# Patient Record
Sex: FEMALE | Race: WHITE | Employment: UNEMPLOYED | URBAN - METROPOLITAN AREA
[De-identification: names, ages, dates, MRNs, and addresses within clinical notes are randomized per-mention and may not be internally consistent; named-entity substitution may affect disease eponyms.]

---

## 2018-01-18 ENCOUNTER — TELEPHONE (OUTPATIENT)
Dept: FAMILY MEDICINE CLINIC | Age: 43
End: 2018-01-18

## 2022-11-21 ENCOUNTER — OFFICE VISIT (OUTPATIENT)
Dept: FAMILY MEDICINE CLINIC | Age: 47
End: 2022-11-21
Payer: MEDICAID

## 2022-11-21 VITALS
SYSTOLIC BLOOD PRESSURE: 139 MMHG | WEIGHT: 252.8 LBS | DIASTOLIC BLOOD PRESSURE: 85 MMHG | TEMPERATURE: 97 F | BODY MASS INDEX: 44.79 KG/M2 | HEART RATE: 92 BPM | HEIGHT: 63 IN

## 2022-11-21 DIAGNOSIS — R73.03 PREDIABETES: Primary | ICD-10-CM

## 2022-11-21 DIAGNOSIS — Z12.11 COLON CANCER SCREENING: ICD-10-CM

## 2022-11-21 DIAGNOSIS — E03.9 HYPOTHYROIDISM, UNSPECIFIED TYPE: ICD-10-CM

## 2022-11-21 DIAGNOSIS — Z00.00 HEALTHCARE MAINTENANCE: ICD-10-CM

## 2022-11-21 LAB — HBA1C MFR BLD: 5.7 %

## 2022-11-21 PROCEDURE — 83036 HEMOGLOBIN GLYCOSYLATED A1C: CPT

## 2022-11-21 ASSESSMENT — ENCOUNTER SYMPTOMS
NAUSEA: 0
COUGH: 0
SHORTNESS OF BREATH: 0
CONSTIPATION: 0
ABDOMINAL PAIN: 0
SORE THROAT: 0
VOMITING: 0
DIARRHEA: 0

## 2022-11-21 ASSESSMENT — PATIENT HEALTH QUESTIONNAIRE - PHQ9
SUM OF ALL RESPONSES TO PHQ QUESTIONS 1-9: 0
2. FEELING DOWN, DEPRESSED OR HOPELESS: 0
SUM OF ALL RESPONSES TO PHQ QUESTIONS 1-9: 0
SUM OF ALL RESPONSES TO PHQ9 QUESTIONS 1 & 2: 0
SUM OF ALL RESPONSES TO PHQ QUESTIONS 1-9: 0
SUM OF ALL RESPONSES TO PHQ QUESTIONS 1-9: 0
DEPRESSION UNABLE TO ASSESS: PT REFUSES
1. LITTLE INTEREST OR PLEASURE IN DOING THINGS: 0

## 2022-11-21 NOTE — PROGRESS NOTES
Mariajose Meza (:  1975) is a 50 y.o. female,New patient, here for evaluation of the following chief complaint(s):  Diabetes (Follow up, blood sugars have been good )    ASSESSMENT/PLAN:    1. Prediabetes  -     POCT glycosylated hemoglobin (Hb A1C)  2. Colon cancer screening  -     Fecal DNA Colorectal cancer screening (Cologuard)  3. Healthcare maintenance  -     Hepatitis C Antibody; Future  -     Lipid Panel; Future  4. Hypothyroidism, unspecified type  -     TSH With Reflex Ft4; Future    We will repeat her TSH at this time. Once results are back and if elevated which I expect to be likely the case we will start her on Synthroid. A1c today 5.7. Patient will likely benefit from diabetes prevention program.  We will discuss further at follow-up. Patient did also state tingling sensation in her bilateral hands. Saba Crock intelligence sign was negative. Patient used to be on gabapentin and is now requesting gabapentin at this time. A1c unremarkable. We will continue to follow for now. Return in about 3 months (around 2023), or if symptoms worsen or fail to improve, for PAP smear. Subjective     SUBJECTIVE/OBJECTIVE:    HPI    Ms. Lilian Ortez, 53-year-old  female, with previous medical history of hypothyroidism and prediabetes. Presents to the [facility] clinic today to reestablish care and to follow-up on diabetes and for thyroidism. Hypothyroidism  Patient's previous TSH in 2019 was 40.05. Patient used to be on levothyroxine 150 mcg oral daily  Last took Synthroid over 2 years ago  Last took any of her medications over 2 years ago for that matter  Does not report any symptoms at this time    Prediabetes  Previous A1c in 2019 was 6.2. Patient used to be on metformin at that time.   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 Quit smoking 16 years ago. Only smoked for 3 months  Denies alcohol  Denies illicit drug use including heroin    Review of Systems   Constitutional:  Negative for activity change and fever. HENT:  Negative for congestion and sore throat. Respiratory:  Negative for cough and shortness of breath. Cardiovascular:  Negative for chest pain and leg swelling. Gastrointestinal:  Negative for abdominal pain, constipation, diarrhea, nausea and vomiting. Genitourinary:  Positive for frequency. Negative for difficulty urinating and dysuria. Neurological:  Negative for syncope and headaches. Psychiatric/Behavioral:  Negative for agitation, behavioral problems and confusion. Objective   Physical Exam  Vitals and nursing note reviewed. Constitutional:       General: She is not in acute distress. Appearance: Normal appearance. Cardiovascular:      Rate and Rhythm: Normal rate and regular rhythm. Heart sounds: No murmur heard. Pulmonary:      Effort: No respiratory distress. Breath sounds: Normal breath sounds. No wheezing. Abdominal:      Palpations: Abdomen is soft. Tenderness: There is no abdominal tenderness. There is no guarding or rebound. Musculoskeletal:         General: No tenderness. Right lower leg: No edema. Left lower leg: No edema. Comments: Fibroma 2-3 cm right sole foot   Neurological:      General: No focal deficit present. Mental Status: She is alert and oriented to person, place, and time. Motor: No weakness. An electronic signature was used to authenticate this note.     --Russ Perez MD

## 2022-11-21 NOTE — PROGRESS NOTES
Diabetic visit information    BP Readings from Last 3 Encounters:   07/25/19 (!) 129/92   05/23/19 109/71   04/29/19 130/89       Hemoglobin A1C (%)   Date Value   03/11/2019 6.2   04/17/2017 6.2     LDL Cholesterol (mg/dL)   Date Value   09/22/2017 104               Have you changed or started any medications since your last visit including any over-the-counter medicines, vitamins, or herbal medicines? no   Have you stopped taking any of your medications? Is so, why? -  no  Are you having any side effects from any of your medications? - no    Have you seen any other physician or provider since your last visit?  no   Have you had any other diagnostic tests since your last visit?  no   Have you been seen in the emergency room and/or had an admission in a hospital since we last saw you?  no     Have you had your annual diabetic retinal (eye) exam? No   (ensure copy of exam is in the chart)    Have you had your routine dental cleaning in the past 6 months? no    Do you have an active [employer] account? If not, what are your barriers? No: Declines    Patient Care Team:  Apolinar Alvarado MD as PCP - General (Family Medicine)    Medical history Review  Past Medical, Family, and Social History reviewed and does contribute to the patient presenting condition.     Health Maintenance   Topic Date Due    COVID-19 Vaccine (1) Never done    Depression Screen  Never done    Hepatitis C screen  Never done    DTaP/Tdap/Td vaccine (1 - Tdap) Never done    Colorectal Cancer Screen  Never done    A1C test (Diabetic or Prediabetic)  03/11/2020    Cervical cancer screen  03/20/2020    Flu vaccine (1) Never done    Lipids  09/22/2022    HIV screen  Completed    Hepatitis A vaccine  Aged Out    Hib vaccine  Aged Out    Meningococcal (ACWY) vaccine  Aged Out    Pneumococcal 0-64 years Vaccine  Aged Out

## 2022-11-21 NOTE — PATIENT INSTRUCTIONS
Thank you for letting us take care of you today. We hope all your questions were addressed. If a question was overlooked or something else comes to mind after you return home, please contact a member of your Care Team listed below. Your Care Team at [place] [address] is Team #2  Shonda Betancur DO (Faculty)  Willis Augustine (Faculty)  Steven Heath MD (Resident)  Sumit Delatorre MD (Resident)  Russ Perez MD (Resident)  Emma Jennings MD (Resident)  Rick Matthews., HELENE Cobos.,  MA  Joss Sinha., ANTHONYN  Duane Estrada., [facility] office)  Luz Marina Quinones, [address] [facility] [address] (Clinical Practice Manager)  Yumiko Dela Cruz [facility] (Clinical Pharmacist)     Office phone number: [phone]    If you need to get in right away due to illness, please be advised we have \"Same Day\" appointments available Monday-Friday. Please call us at [phone] option #3 to schedule your \"Same Day\" appointment.

## 2022-11-21 NOTE — PROGRESS NOTES
Attending Physician Statement  I have discussed the care of Warren Catalan, 50 y.o. female,including pertinent history and exam findings,  with the resident Dr. Shahla Jean-Baptiste MD.  History:  Chief Complaint   Patient presents with    Diabetes     Follow up, blood sugars have been good      Patient has not been seen in 3 years. I have reviewed the key elements of the encounter with the resident. Examination was done by resident as documented in residents note. BP Readings from Last 3 Encounters:   11/21/22 139/85   07/25/19 (!) 129/92   05/23/19 109/71     /85 (Site: Right Lower Arm, Position: Sitting, Cuff Size: Medium Adult)   Pulse 92   Temp 97 °F (36.1 °C) (Oral)   Ht 5' 2.99\" (1.6 m)   Wt 252 lb 12.8 oz (114.7 kg)   BMI 44.79 kg/m²   Lab Results   Component Value Date    CHOL 194 09/22/2017    TRIG 133 09/22/2017    HDL 63 09/22/2017    TSH 40.05 (H) 04/29/2019    LABA1C 5.7 11/21/2022     No results found for: CALCIUM, PHOS  Lab Results   Component Value Date    LDLCHOLESTEROL 104 09/22/2017     I agree with the assessment, plan and diagnosis of    Diagnosis Orders   1. Prediabetes  POCT glycosylated hemoglobin (Hb A1C)      2. Colon cancer screening  Fecal DNA Colorectal cancer screening (Cologuard)      3. Healthcare maintenance  Hepatitis C Antibody    Lipid Panel      4. Hypothyroidism, unspecified type  TSH With Reflex Ft4        I agree with  orders as documented by the resident. Recommendations: Diabetic Prevention program would likely be very beneficial for patient, will follow up on lab results chiefly her TSH and resume her Synthroid ASAP. Will also consider repeating/doing EMG for hand complaints and consider other pain relief medications for possible neuropathic pain (eg. SNRI, TCA, caspacin, etc) in setting of Suboxone therapy and history of drug abuse. Resident team to follow-up on lab results and communicate this with patient.     Return in about 3 months (around 2/21/2023), or if symptoms worsen or fail to improve, for PAP smear.    (Elenita Stanley ) Dr. Michael Coughlin MD

## 2022-11-29 ENCOUNTER — TELEPHONE (OUTPATIENT)
Dept: FAMILY MEDICINE CLINIC | Age: 47
End: 2022-11-29

## 2022-11-29 DIAGNOSIS — M72.2 PLANTAR FASCIAL FIBROMATOSIS OF RIGHT FOOT: Primary | ICD-10-CM

## 2022-11-29 NOTE — TELEPHONE ENCOUNTER
Wants a note for [employer] stating she can't do her services right now due to her feet issues.   Please Advise

## 2022-11-29 NOTE — LETTER
[address]  [address]  [place] [address]  Phone: [phone]  Fax: [phone]    Apolinar Alvarado MD        November 29, 2022    [facility] [address], [address] [facility]      To Whom It May Concern,    Ms. Sofia Hawk was seen at our clinic on 11/21/2022. She has a nodule on her right foot that causes her pain. I would like to request 1-2 days off for her to allow for recovery. I would also like to request work that does not involve increased amounts of stress on her feet. If you have any questions or concerns, please don't hesitate to call.     Sincerely,        Apolinar Alvarado MD

## 2022-12-01 ENCOUNTER — TELEMEDICINE (OUTPATIENT)
Dept: FAMILY MEDICINE CLINIC | Age: 47
End: 2022-12-01
Payer: MEDICAID

## 2022-12-01 ENCOUNTER — HOSPITAL ENCOUNTER (OUTPATIENT)
Age: 47
Setting detail: SPECIMEN
Discharge: HOME | End: 2022-12-01

## 2022-12-01 DIAGNOSIS — M79.89 BILATERAL SWELLING OF FEET: ICD-10-CM

## 2022-12-01 DIAGNOSIS — M79.89 BILATERAL SWELLING OF FEET: Primary | ICD-10-CM

## 2022-12-01 LAB
ALBUMIN SERPL-MCNC: 4.3 G/DL (ref 3.5–5.2)
ALBUMIN/GLOBULIN RATIO: 1.3 (ref 1–2.5)
ALP BLD-CCNC: 127 U/L (ref 35–104)
ALT SERPL-CCNC: 13 U/L (ref 5–33)
ANION GAP SERPL CALCULATED.3IONS-SCNC: 12 MMOL/L (ref 9–17)
AST SERPL-CCNC: 20 U/L
BILIRUB SERPL-MCNC: 0.3 MG/DL (ref 0.3–1.2)
BUN BLDV-MCNC: 14 MG/DL (ref 6–20)
CALCIUM SERPL-MCNC: 9.3 MG/DL (ref 8.6–10.4)
CHLORIDE BLD-SCNC: 95 MMOL/L (ref 98–107)
CO2: 31 MMOL/L (ref 20–31)
CREAT SERPL-MCNC: 0.64 MG/DL (ref 0.5–0.9)
GFR SERPL CREATININE-BSD FRML MDRD: >60 ML/MIN/1.73M2
GLUCOSE BLD-MCNC: 94 MG/DL (ref 70–99)
POTASSIUM SERPL-SCNC: 4 MMOL/L (ref 3.7–5.3)
SODIUM BLD-SCNC: 138 MMOL/L (ref 135–144)
TOTAL PROTEIN: 7.5 G/DL (ref 6.4–8.3)

## 2022-12-01 PROCEDURE — 99422 OL DIG E/M SVC 11-20 MIN: CPT | Performed by: STUDENT IN AN ORGANIZED HEALTH CARE EDUCATION/TRAINING PROGRAM

## 2022-12-01 RX ORDER — FUROSEMIDE 40 MG/1
40 TABLET ORAL DAILY
Qty: 7 TABLET | Refills: 0 | Status: SHIPPED | OUTPATIENT
Start: 2022-12-01 | End: 2022-12-08

## 2022-12-01 SDOH — ECONOMIC STABILITY: FOOD INSECURITY: WITHIN THE PAST 12 MONTHS, YOU WORRIED THAT YOUR FOOD WOULD RUN OUT BEFORE YOU GOT MONEY TO BUY MORE.: NEVER TRUE

## 2022-12-01 SDOH — ECONOMIC STABILITY: FOOD INSECURITY: WITHIN THE PAST 12 MONTHS, THE FOOD YOU BOUGHT JUST DIDN'T LAST AND YOU DIDN'T HAVE MONEY TO GET MORE.: NEVER TRUE

## 2022-12-01 ASSESSMENT — PATIENT HEALTH QUESTIONNAIRE - PHQ9
SUM OF ALL RESPONSES TO PHQ9 QUESTIONS 1 & 2: 0
2. FEELING DOWN, DEPRESSED OR HOPELESS: 0
SUM OF ALL RESPONSES TO PHQ QUESTIONS 1-9: 0
SUM OF ALL RESPONSES TO PHQ QUESTIONS 1-9: 0
1. LITTLE INTEREST OR PLEASURE IN DOING THINGS: 0
SUM OF ALL RESPONSES TO PHQ QUESTIONS 1-9: 0
SUM OF ALL RESPONSES TO PHQ QUESTIONS 1-9: 0

## 2022-12-01 ASSESSMENT — SOCIAL DETERMINANTS OF HEALTH (SDOH): HOW HARD IS IT FOR YOU TO PAY FOR THE VERY BASICS LIKE FOOD, HOUSING, MEDICAL CARE, AND HEATING?: NOT HARD AT ALL

## 2022-12-01 NOTE — PROGRESS NOTES
Visit Information    Have you changed or started any medications since your last visit including any over-the-counter medicines, vitamins, or herbal medicines? no   Are you having any side effects from any of your medications? -  no  Have you stopped taking any of your medications? Is so, why? -  no    Have you seen any other physician or provider since your last visit? No  Have you had any other diagnostic tests since your last visit? No  Have you been seen in the emergency room and/or had an admission to a hospital since we last saw you? No  Have you had your routine dental cleaning in the past 6 months? no    Have you activated your [employer] account? If not, what are your barriers?  No:      Patient Care Team:  Kevin Nolan MD as PCP - General (Family Medicine)    Medical History Review  Past Medical, Family, and Social History reviewed and does not contribute to the patient presenting condition    Health Maintenance   Topic Date Due    COVID-19 Vaccine (1) Never done    Hepatitis C screen  Never done    DTaP/Tdap/Td vaccine (1 - Tdap) Never done    Colorectal Cancer Screen  Never done    Cervical cancer screen  03/20/2020    Flu vaccine (1) Never done    Lipids  09/22/2022    A1C test (Diabetic or Prediabetic)  11/21/2023    Depression Screen  11/21/2023    HIV screen  Completed    Hepatitis A vaccine  Aged Out    Hib vaccine  Aged Out    Meningococcal (ACWY) vaccine  Aged Out    Pneumococcal 0-64 years Vaccine  Aged Out

## 2022-12-01 NOTE — PROGRESS NOTES
Darcy Arroyo is a 50 y.o. female evaluated via telephone on 12/1/2022 for Foot Swelling (Worse then last appointment, when standing feet were numb)  . Documentation:  I communicated with the patient and/or health care decision maker about bilateral feet swelling. Details of this discussion including any medical advice provided:     Patient is a 64-year-old female with complaints of bilateral feet swelling  Ongoing for several days, making it difficult to walk  Patient denies having any chest pain, shortness of breath, fever/chills, warmth or tenderness of bilateral feet  Does not have any compression stockings at home  No prior cardiac work-up    1. Bilateral swelling of feet  -Patient to make in person appointment for early next week  -Consider cardiac echo  -Discussed with patient to elevate feet whenever sitting  - Comprehensive Metabolic Panel; Future  - furosemide (LASIX) 40 MG tablet; Take 1 tablet by mouth daily for 7 days  Dispense: 7 tablet; Refill: 0     Total Time: minutes: 11-20 minutes    Darcy Arroyo was evaluated through a synchronous (real-time) audio encounter. Patient identification was verified at the start of the visit. She (or guardian if applicable) is aware that this is a billable service, which includes applicable co-pays. This visit was conducted with the patient's (and/or legal guardian's) verbal consent. She has not had a related appointment within my department in the past 7 days or scheduled within the next 24 hours. The patient was located at Home: [address], [address] [place] [address]. The provider was located at [facility] (Appt Dept): [facility] [address]     Note: not billable if this call serves to triage the patient into an appointment for the relevant concern    Rudy Shaw MD

## 2022-12-07 ENCOUNTER — HOSPITAL ENCOUNTER (OUTPATIENT)
Age: 47
Setting detail: SPECIMEN
Discharge: HOME | End: 2022-12-07

## 2022-12-07 ENCOUNTER — OFFICE VISIT (OUTPATIENT)
Dept: FAMILY MEDICINE CLINIC | Age: 47
End: 2022-12-07

## 2022-12-07 VITALS
TEMPERATURE: 97 F | BODY MASS INDEX: 44.9 KG/M2 | DIASTOLIC BLOOD PRESSURE: 89 MMHG | WEIGHT: 253.4 LBS | SYSTOLIC BLOOD PRESSURE: 134 MMHG | HEIGHT: 63 IN | HEART RATE: 93 BPM

## 2022-12-07 DIAGNOSIS — F11.91 OPIOID USE, UNSPECIFIED, IN REMISSION: ICD-10-CM

## 2022-12-07 DIAGNOSIS — E03.9 HYPOTHYROIDISM, UNSPECIFIED TYPE: ICD-10-CM

## 2022-12-07 DIAGNOSIS — M79.89 BILATERAL SWELLING OF FEET: Primary | ICD-10-CM

## 2022-12-07 LAB — TSH SERPL DL<=0.05 MIU/L-ACNC: 73.27 UIU/ML (ref 0.3–5)

## 2022-12-07 RX ORDER — CITALOPRAM HYDROBROMIDE 20 MG/10ML
20 SOLUTION, ORAL ORAL DAILY
COMMUNITY

## 2022-12-07 ASSESSMENT — ENCOUNTER SYMPTOMS
DIARRHEA: 0
RHINORRHEA: 0
EYE DISCHARGE: 0
WHEEZING: 0
SHORTNESS OF BREATH: 1
SORE THROAT: 0
VOMITING: 0
NAUSEA: 0
COUGH: 0
CONSTIPATION: 0
ABDOMINAL PAIN: 0

## 2022-12-07 NOTE — PROGRESS NOTES
Jeremi Alejo (:  1975) is a 50 y.o. female,Established patient, here for evaluation of the following chief complaint(s): Foot Swelling (Has had for a couple months, as been bad this month)         ASSESSMENT/PLAN:  1. Bilateral swelling of feet  -     ECHO 2D WO Color Doppler Complete; Future  2. Hypothyroidism, unspecified type  -     TSH; Future, reviewed TSH from  which was 73.27  -     Refilled levothyroxine (SYNTHROID) 150 MCG tablet; Take 1 tablet by mouth daily, Disp-45 tablet, R-0Normal  3. Opioid use, unspecified, in remission  Patient sees addiction specialist at [facility] in about 2 weeks (around 2022). Subjective   SUBJECTIVE/OBJECTIVE:  HPI      Congestive Heart Failure  Patient presents for re-evaluation of congestive heart failure. Patient's current complaints are fatigue, lower extremity edema, and palpitations. She denies chest pain, chest pressure/discomfort, syncope, and tachypnea. She states she is noncompliant: patient had no insurance and went without medications for   with her medications. She states she is noncompliant:    with her diet. Jeremi Alejo is a 50 y.o. female who presents for follow up of hypothyroidism. Current symptoms: change in energy level, heat / cold intolerance, palpitations, and weight changes. Patient denies diarrhea. Symptoms have gradually worsened. Review of Systems   Constitutional:  Positive for fatigue. Negative for appetite change and fever. HENT:  Negative for ear pain, rhinorrhea and sore throat. Eyes:  Negative for discharge and visual disturbance. Respiratory:  Positive for shortness of breath. Negative for cough and wheezing. Cardiovascular:  Positive for palpitations and leg swelling. Negative for chest pain. Gastrointestinal:  Negative for abdominal pain, constipation, diarrhea, nausea and vomiting. Endocrine: Negative for polyuria. Genitourinary:  Negative for dysuria.    Musculoskeletal: Negative for arthralgias. Skin:  Negative for rash. Neurological:  Negative for dizziness, weakness, light-headedness and headaches. Psychiatric/Behavioral:  Negative for agitation. Objective   Physical Exam  Constitutional:       General: She is not in acute distress. Appearance: Normal appearance. She is obese. Cardiovascular:      Rate and Rhythm: Normal rate and regular rhythm. Heart sounds: Normal heart sounds. No murmur heard. No friction rub. Pulmonary:      Breath sounds: Normal breath sounds. No wheezing or rales. Abdominal:      General: Bowel sounds are normal.      Palpations: Abdomen is soft. Tenderness: There is no abdominal tenderness. There is no guarding or rebound. Musculoskeletal:         General: No swelling or tenderness. Normal range of motion. Right lower leg: Edema present. Left lower leg: Edema present. Skin:     Findings: No rash. Neurological:      Mental Status: She is alert and oriented to person, place, and time. Psychiatric:         Mood and Affect: Mood normal.                An electronic signature was used to authenticate this note.     --Valente Latham MD

## 2022-12-07 NOTE — LETTER
[address]  [address]  [place] [address]  Phone: [phone]  Fax: [phone]    Roberto Bradford MD        December 7, 2022     Patient: Damian Gorman   YOB: 1975   Date of Visit: 12/7/2022       To Whom It May Concern: It is my medical opinion that Orlin Will should remain out of work until 12/21/2022. Patient was seen and evaluated in our office today for medical condition which requires further testing. If you have any questions or concerns, please don't hesitate to call.     Sincerely,        Roberto Bradford MD

## 2022-12-07 NOTE — PROGRESS NOTES
Visit Information    Have you changed or started any medications since your last visit including any over-the-counter medicines, vitamins, or herbal medicines? no   Have you stopped taking any of your medications? Is so, why? -  no  Are you having any side effects from any of your medications? - no    Have you seen any other physician or provider since your last visit?  no   Have you had any other diagnostic tests since your last visit? yes - Labs   Have you been seen in the emergency room and/or had an admission in a hospital since we last saw you?  no   Have you had your routine dental cleaning in the past 6 months?  no     Do you have an active MyChart account? If no, what is the barrier?   No: Pending    Patient Care Team:  Sylvie Santillan MD as PCP - General (Family Medicine)    Medical History Review  Past Medical, Family, and Social History reviewed and does contribute to the patient presenting condition    Health Maintenance   Topic Date Due    COVID-19 Vaccine (1) Never done    Hepatitis C screen  Never done    DTaP/Tdap/Td vaccine (1 - Tdap) Never done    Colorectal Cancer Screen  Never done    Cervical cancer screen  03/20/2020    Flu vaccine (1) Never done    Lipids  09/22/2022    A1C test (Diabetic or Prediabetic)  11/21/2023    Depression Screen  12/01/2023    HIV screen  Completed    Hepatitis A vaccine  Aged Out    Hib vaccine  Aged Out    Meningococcal (ACWY) vaccine  Aged Out    Pneumococcal 0-64 years Vaccine  Aged Out

## 2022-12-07 NOTE — PROGRESS NOTES
Attending Physician Statement  I have discussed the care of Giulia Swartz, 50 y.o. female,including pertinent history and exam findings,  with the resident Dr. Stephon Siddiqi MD.  History:  Chief Complaint   Patient presents with    Foot Swelling     Has had for a couple months, as been bad this month     Patient is requesting letter for her food stamp program due to difficulty standing on her feet prolonged periods of time. I have reviewed the key elements of the encounter with the resident. Examination was done by resident as documented in residents note. BP Readings from Last 3 Encounters:   12/07/22 134/89   11/21/22 139/85   07/25/19 (!) 129/92     /89 (Site: Right Upper Arm, Position: Sitting, Cuff Size: Large Adult)   Pulse 93   Temp 97 °F (36.1 °C) (Oral)   Ht 5' 2.99\" (1.6 m)   Wt 253 lb 6.4 oz (114.9 kg)   BMI 44.90 kg/m²   Lab Results   Component Value Date    CHOL 194 09/22/2017    TRIG 133 09/22/2017    HDL 63 09/22/2017    ALT 13 12/01/2022    AST 20 12/01/2022     12/01/2022    K 4.0 12/01/2022    CL 95 (L) 12/01/2022    CREATININE 0.64 12/01/2022    BUN 14 12/01/2022    CO2 31 12/01/2022    TSH 40.05 (H) 04/29/2019    LABA1C 5.7 11/21/2022     Lab Results   Component Value Date    CALCIUM 9.3 12/01/2022     Lab Results   Component Value Date    LDLCHOLESTEROL 104 09/22/2017     I agree with the assessment, plan and diagnosis of    Diagnosis Orders   1. Bilateral swelling of feet  ECHO 2D WO Color Doppler Complete      2. Hypothyroidism, unspecified type  TSH      3. Opioid use, unspecified, in remission          I agree with  orders as documented by the resident. Recommendations: We will restart Synthroid as patient has been off Synthroid for several months and last TSH was at 40. Patient lower extremity edema possibly secondary to her hypothyroidism.   However, with patient history will obtain echo and obtain further history and further evaluation at next visit for patient's cardiovascular health. Patient does not report any other cardiovascular symptoms per resident report on review of systems. Close follow-up advised. Return in about 2 weeks (around 12/21/2022).    (Lashawn Whitten ) Dr. Kasie Alonzo MD

## 2022-12-07 NOTE — PATIENT INSTRUCTIONS
Thank you for letting us take care of you today. We hope all your questions were addressed. If a question was overlooked or something else comes to mind after you return home, please contact a member of your Care Team listed below. Your Care Team at [place] [address] is Team #2  Zhou Orozco DO (Faculty)  Laura Grider (Faculty)  Melissa Garcia MD (Resident)  Flory Michelle MD (Resident)  Yasmin Carmona MD (Resident)  Claudette Sharma MD (Resident)  Evangelina Kuo., HELENE Wyatt.,  MA  Aysha Mancia., LPN  Maday Nunez., [facility] office)  Edis Mcginnis, [address] (Clinical Practice Manager)  Kath Syed [facility] (Clinical Pharmacist)     Office phone number: [phone]    If you need to get in right away due to illness, please be advised we have \"Same Day\" appointments available Monday-Friday. Please call us at [phone] option #3 to schedule your \"Same Day\" appointment.

## 2022-12-08 ENCOUNTER — TELEPHONE (OUTPATIENT)
Dept: FAMILY MEDICINE CLINIC | Age: 47
End: 2022-12-08

## 2022-12-08 RX ORDER — LEVOTHYROXINE SODIUM 0.15 MG/1
150 TABLET ORAL DAILY
Qty: 45 TABLET | Refills: 0 | Status: SHIPPED | OUTPATIENT
Start: 2022-12-08

## 2022-12-08 NOTE — TELEPHONE ENCOUNTER
----- Message from Hugo Yang sent at 12/8/2022 11:59 AM EST -----  Subject: Message to Provider    QUESTIONS  Information for Provider? PT WAS JUST SEEN 12- AND HAS AN APPT ON   12- FOR A PAP AND ECHO RESULTS PT HAS NOT HAD AN ECHO DONE YET ,PT   NEEDS TO KNOW IF SHE [address] THAT [facility] [address] , PLEASE CALL TO LET   PT KNOW WHAT TO DO ,SHOULD SHE KEEP APPT FOR 12- THE ELI MARCELINO [facility]  ---------------------------------------------------------------------------  --------------  Yudith Pennington JXWD  [phone]; OK to leave message on voicemail  ---------------------------------------------------------------------------  --------------  SCRIPT ANSWERS  Relationship to Patient?  Self

## 2022-12-19 ENCOUNTER — TELEPHONE (OUTPATIENT)
Dept: FAMILY MEDICINE CLINIC | Age: 47
End: 2022-12-19

## 2022-12-19 NOTE — TELEPHONE ENCOUNTER
Patient called office stating that she was just seen in office on 12/7/22 and wants to know if she can restart her Gabapentin to help with the pain in her legs/feet, please advise

## 2022-12-20 ENCOUNTER — TELEPHONE (OUTPATIENT)
Dept: FAMILY MEDICINE CLINIC | Age: 47
End: 2022-12-20

## 2022-12-20 NOTE — TELEPHONE ENCOUNTER
Patient called back asking about her Gabapentin script. Patient was informed PCP is with patient's today, and message was sent to him. Will address as soon as he can.

## 2022-12-20 NOTE — TELEPHONE ENCOUNTER
Discussed with patient that she requires an appointment to discuss her concerns further. Patient also told that we are not refilling her gabapentin at this time. Patient voiced understanding.

## 2022-12-20 NOTE — TELEPHONE ENCOUNTER
Patient calling again because she spoke with our office on 12/19/22 regarding restarting her Gabapentin. Message was never sent to the provider as the encounter was signed before being routed to PCP. Please advise.

## 2023-01-23 RX ORDER — CALCIUM CITRATE/VITAMIN D3 200MG-6.25
TABLET ORAL
Qty: 100 EACH | Refills: 3 | Status: SHIPPED | OUTPATIENT
Start: 2023-01-23

## 2023-01-23 NOTE — TELEPHONE ENCOUNTER
----- Message from Pastor Snow sent at 1/19/2023  9:51 AM EST -----  Subject: Refill Request    QUESTIONS  Name of Medication? TRUE METRIX BLOOD GLUCOSE TEST strip  Patient-reported dosage and instructions? test strips for sugar   How many days do you have left? 0  Preferred [facility]acy? [id number]  Pharmacy phone number (if available)? [phone]  Additional Information for Provider? patient is completely out   ---------------------------------------------------------------------------  --------------  [address]  What is the best way for the office to contact you? OK to leave message on   voicemail  Preferred Call Back Phone Number? [phone]  ---------------------------------------------------------------------------  --------------  SCRIPT ANSWERS  Relationship to Patient?  Self

## 2023-01-23 NOTE — TELEPHONE ENCOUNTER
E-scribe request for med refills. Please review and e-scribe if applicable.      Last Visit Date:  12/7/22  Next Visit Date:  1/27/2023    Hemoglobin A1C (%)   Date Value   11/21/2022 5.7   03/11/2019 6.2   04/17/2017 6.2             ( goal A1C is < 7)   No results found for: LABMICR  LDL Cholesterol (mg/dL)   Date Value   09/22/2017 104       (goal LDL is <100)   AST (U/L)   Date Value   12/01/2022 20     ALT (U/L)   Date Value   12/01/2022 13     BUN (mg/dL)   Date Value   12/01/2022 14     BP Readings from Last 3 Encounters:   12/07/22 134/89   11/21/22 139/85   07/25/19 (!) 129/92          (goal 120/80)        Patient Active Problem List:     Bilateral swelling of feet     Hypothyroidism      ----Erin Ka

## 2023-02-01 DIAGNOSIS — E03.9 HYPOTHYROIDISM, UNSPECIFIED TYPE: ICD-10-CM

## 2023-02-01 RX ORDER — LEVOTHYROXINE SODIUM 0.15 MG/1
150 TABLET ORAL DAILY
Qty: 45 TABLET | Refills: 0 | Status: SHIPPED | OUTPATIENT
Start: 2023-02-01

## 2023-02-01 NOTE — TELEPHONE ENCOUNTER
E-scribe request for med refill. Please review and e-scribe if applicable.      Last Visit Date:  12/07/2022  Next Visit Date:  2/10/2023    Hemoglobin A1C (%)   Date Value   11/21/2022 5.7   03/11/2019 6.2   04/17/2017 6.2             ( goal A1C is < 7)   No results found for: LABMICR  LDL Cholesterol (mg/dL)   Date Value   09/22/2017 104       (goal LDL is <100)   AST (U/L)   Date Value   12/01/2022 20     ALT (U/L)   Date Value   12/01/2022 13     BUN (mg/dL)   Date Value   12/01/2022 14     BP Readings from Last 3 Encounters:   12/07/22 134/89   11/21/22 139/85   07/25/19 (!) 129/92          (goal 120/80)        Patient Active Problem List:     Bilateral swelling of feet     Hypothyroidism      ----Perla January

## 2023-02-22 RX ORDER — CALCIUM CITRATE/VITAMIN D3 200MG-6.25
TABLET ORAL
Qty: 100 EACH | Refills: 3 | Status: SHIPPED | OUTPATIENT
Start: 2023-02-22

## 2023-02-22 NOTE — TELEPHONE ENCOUNTER
Last visit: 12/7/22  Last Med refill: 1/23/23  Does patient have enough medication for 72 hours: Yes    Next Visit Date:  Future Appointments   Date Time Provider Adrian Kearneyi   2/23/2023 11:30 AM [facility] ECHO RM 1 [facility] [place]   2/27/2023 11:00 AM Eliel Rivera MD [address] [place] [address] Maintenance   Topic Date Due    COVID-19 Vaccine (1) Never done    Hepatitis C screen  Never done    DTaP/Tdap/Td vaccine (1 - Tdap) Never done    Colorectal Cancer Screen  Never done    Cervical cancer screen  03/20/2020    Flu vaccine (1) Never done    Lipids  09/22/2022    A1C test (Diabetic or Prediabetic)  11/21/2023    Depression Screen  12/01/2023    HIV screen  Completed    Hepatitis A vaccine  Aged Out    Hib vaccine  Aged Out    Meningococcal (ACWY) vaccine  Aged Out    Pneumococcal 0-64 years Vaccine  Aged Out       Hemoglobin A1C (%)   Date Value   11/21/2022 5.7   03/11/2019 6.2   04/17/2017 6.2             ( goal A1C is < 7)   No results found for: LABMICR  LDL Cholesterol (mg/dL)   Date Value   09/22/2017 104       (goal LDL is <100)   AST (U/L)   Date Value   12/01/2022 20     ALT (U/L)   Date Value   12/01/2022 13     BUN (mg/dL)   Date Value   12/01/2022 14     BP Readings from Last 3 Encounters:   12/07/22 134/89   11/21/22 139/85   07/25/19 (!) 129/92          (goal 120/80)    All Future Testing planned in [facility]  Lab Frequency Next Occurrence   TSH With Reflex Ft4 Once 11/21/2022   Hepatitis C Antibody Once 11/21/2022   Lipid Panel Once 11/21/2022   ECHO 2D WO Color Doppler Complete Once 12/08/2022               Patient Active Problem List:     Bilateral swelling of feet     Hypothyroidism

## 2023-02-27 ENCOUNTER — HOSPITAL ENCOUNTER (OUTPATIENT)
Age: 48
Setting detail: SPECIMEN
Discharge: HOME | End: 2023-02-27

## 2023-02-27 ENCOUNTER — OFFICE VISIT (OUTPATIENT)
Dept: FAMILY MEDICINE CLINIC | Age: 48
End: 2023-02-27
Payer: COMMERCIAL

## 2023-02-27 VITALS
HEIGHT: 63 IN | WEIGHT: 256.4 LBS | BODY MASS INDEX: 45.43 KG/M2 | SYSTOLIC BLOOD PRESSURE: 100 MMHG | DIASTOLIC BLOOD PRESSURE: 68 MMHG | HEART RATE: 94 BPM

## 2023-02-27 DIAGNOSIS — E03.9 HYPOTHYROIDISM, UNSPECIFIED TYPE: ICD-10-CM

## 2023-02-27 DIAGNOSIS — Z00.00 HEALTHCARE MAINTENANCE: ICD-10-CM

## 2023-02-27 DIAGNOSIS — F51.01 PRIMARY INSOMNIA: Primary | ICD-10-CM

## 2023-02-27 LAB
CHOLEST SERPL-MCNC: 173 MG/DL
CHOLESTEROL/HDL RATIO: 2.7
HCV AB SER QL: NONREACTIVE
HDLC SERPL-MCNC: 65 MG/DL
LDLC SERPL CALC-MCNC: 88 MG/DL (ref 0–130)
TRIGL SERPL-MCNC: 102 MG/DL
TSH SERPL-ACNC: 13.45 UIU/ML (ref 0.3–5)

## 2023-02-27 PROCEDURE — 99213 OFFICE O/P EST LOW 20 MIN: CPT

## 2023-02-27 RX ORDER — LANOLIN ALCOHOL/MO/W.PET/CERES
3 CREAM (GRAM) TOPICAL NIGHTLY PRN
Qty: 30 TABLET | Refills: 3 | Status: SHIPPED | OUTPATIENT
Start: 2023-02-27

## 2023-02-27 SDOH — ECONOMIC STABILITY: FOOD INSECURITY: WITHIN THE PAST 12 MONTHS, YOU WORRIED THAT YOUR FOOD WOULD RUN OUT BEFORE YOU GOT MONEY TO BUY MORE.: SOMETIMES TRUE

## 2023-02-27 SDOH — ECONOMIC STABILITY: FOOD INSECURITY: WITHIN THE PAST 12 MONTHS, THE FOOD YOU BOUGHT JUST DIDN'T LAST AND YOU DIDN'T HAVE MONEY TO GET MORE.: SOMETIMES TRUE

## 2023-02-27 SDOH — ECONOMIC STABILITY: INCOME INSECURITY: HOW HARD IS IT FOR YOU TO PAY FOR THE VERY BASICS LIKE FOOD, HOUSING, MEDICAL CARE, AND HEATING?: SOMEWHAT HARD

## 2023-02-27 SDOH — ECONOMIC STABILITY: HOUSING INSECURITY
IN THE LAST 12 MONTHS, WAS THERE A TIME WHEN YOU DID NOT HAVE A STEADY PLACE TO SLEEP OR SLEPT IN A SHELTER (INCLUDING NOW)?: YES

## 2023-02-27 ASSESSMENT — PATIENT HEALTH QUESTIONNAIRE - PHQ9
4. FEELING TIRED OR HAVING LITTLE ENERGY: 3
SUM OF ALL RESPONSES TO PHQ QUESTIONS 1-9: 21
7. TROUBLE CONCENTRATING ON THINGS, SUCH AS READING THE NEWSPAPER OR WATCHING TELEVISION: 3
9. THOUGHTS THAT YOU WOULD BE BETTER OFF DEAD, OR OF HURTING YOURSELF: 0
3. TROUBLE FALLING OR STAYING ASLEEP: 2
6. FEELING BAD ABOUT YOURSELF - OR THAT YOU ARE A FAILURE OR HAVE LET YOURSELF OR YOUR FAMILY DOWN: 3
SUM OF ALL RESPONSES TO PHQ QUESTIONS 1-9: 21
2. FEELING DOWN, DEPRESSED OR HOPELESS: 3
SUM OF ALL RESPONSES TO PHQ9 QUESTIONS 1 & 2: 6
1. LITTLE INTEREST OR PLEASURE IN DOING THINGS: 3
8. MOVING OR SPEAKING SO SLOWLY THAT OTHER PEOPLE COULD HAVE NOTICED. OR THE OPPOSITE, BEING SO FIGETY OR RESTLESS THAT YOU HAVE BEEN MOVING AROUND A LOT MORE THAN USUAL: 2
5. POOR APPETITE OR OVEREATING: 2
10. IF YOU CHECKED OFF ANY PROBLEMS, HOW DIFFICULT HAVE THESE PROBLEMS MADE IT FOR YOU TO DO YOUR WORK, TAKE CARE OF THINGS AT HOME, OR GET ALONG WITH OTHER PEOPLE: 2

## 2023-02-27 ASSESSMENT — ENCOUNTER SYMPTOMS
SHORTNESS OF BREATH: 0
VOMITING: 0
CONSTIPATION: 0
COUGH: 0
NAUSEA: 0
ABDOMINAL PAIN: 0
DIARRHEA: 0

## 2023-02-27 ASSESSMENT — COLUMBIA-SUICIDE SEVERITY RATING SCALE - C-SSRS
1. WITHIN THE PAST MONTH, HAVE YOU WISHED YOU WERE DEAD OR WISHED YOU COULD GO TO SLEEP AND NOT WAKE UP?: NO
6. HAVE YOU EVER DONE ANYTHING, STARTED TO DO ANYTHING, OR PREPARED TO DO ANYTHING TO END YOUR LIFE?: NO
2. HAVE YOU ACTUALLY HAD ANY THOUGHTS OF KILLING YOURSELF?: NO

## 2023-02-27 NOTE — PROGRESS NOTES
Rosa Maria Griffiths (:  1975) is a 50 y.o. female,Established patient, here for evaluation of the following chief complaint(s): Foot Swelling (Follow up Bilateral feet swelling), Letter (Patient states her  with [employer] told her if patient's PCP can give her a letter excusing her from work from January - 2023, she will be excused from work for the rest of the year (to work for her food stamps). ), Anxiety (Getting worse - having issues sleeping at night - a lot of family stress - has appointment with [facility] 23 for assessment - having panic attacks - ), and Orders (Patient missed appointment for Echo due to transportation - will call to reschedule - patient reports still having palpitations - )    ASSESSMENT/PLAN:    1. Primary insomnia  -     melatonin (RA MELATONIN) 3 MG TABS tablet; Take 1 tablet by mouth nightly as needed (Insomnia), Disp-30 tablet, R-3Normal  2. Hypothyroidism, unspecified type  -     TSH With Reflex Ft4; Future    For anxiety and depression, patient [facility]. For her primary insomnia we will give her melatonin for now. Discussed with patient that we will not go beyond melatonin at this time. For her hypothyroidism we will repeat her TSH and adjust her Synthroid medication accordingly. Patient's lower limb edema, anxiety, palpitations may all be due to uncontrolled thyroid. Patient also requesting letter for work stating that she was unable to work from  due to her symptoms. Discussed with patient that she does not have any medical reason to not work at this time however we will provide letter. Discussed with patient that if she requires FMLA at any point that we will refuse her FMLA. She voiced understanding. Return in about 2 months (around 2023), or if symptoms worsen or fail to improve, for Depression. Thyroid. Subjective     SUBJECTIVE/OBJECTIVE:    HPI    Ms.  Rehan Vargas, 20-year-old  female, with previous medical history of hypothyroidism and prediabetes. Presents to the [facility] medicine clinic today for follow-up on feet swelling and hypothyroidism. Hypothyroidism  TSH in December over 70  Patient is compliant with Synthroid 150  Prior to December was noncompliant    Patient also complains of palpitations, anxiety, and lower limb edema. This could all be related to uncontrolled hypothyroidism. Patient was given an order for echocardiogram that she needs to complete at this time. Patient has difficulty obtaining transportation and is in the process of completing her echocardiogram.    Patient does have history of prediabetes. Was taken off of metformin. We will continue to monitor at this time. Patient also complains of primary insomnia. States that she has difficulty falling and staying asleep. Patient does not have any other acute concerns at today's visit. Review of Systems   Constitutional:  Negative for activity change, chills and fever. Respiratory:  Negative for cough and shortness of breath. Cardiovascular:  Positive for leg swelling. Negative for chest pain. Gastrointestinal:  Negative for abdominal pain, constipation, diarrhea, nausea and vomiting. Genitourinary:  Positive for frequency. Negative for difficulty urinating and dysuria. Neurological:  Negative for syncope and headaches. Psychiatric/Behavioral:  Negative for agitation, behavioral problems and confusion. Objective   Physical Exam  Vitals and nursing note reviewed. Constitutional:       General: She is not in acute distress. Appearance: Normal appearance. She is not ill-appearing. Cardiovascular:      Rate and Rhythm: Normal rate and regular rhythm. Heart sounds: No murmur heard. Pulmonary:      Effort: No respiratory distress. Breath sounds: Normal breath sounds. No wheezing. Abdominal:      Palpations: Abdomen is soft. Tenderness:  There is no abdominal tenderness. There is no guarding or rebound. Musculoskeletal:         General: No tenderness. Right lower leg: Edema (Nonpitting) present. Left lower leg: Edema (Nonpitting) present. Neurological:      General: No focal deficit present. Mental Status: She is alert and oriented to person, place, and time. Motor: No weakness. An electronic signature was used to authenticate this note.     --Wojciech Cheung MD

## 2023-02-27 NOTE — PROGRESS NOTES
Attending Physician Statement  I have discussed the care of Kalyn Abrams 50 y.o. female, including pertinent history and exam findings, with the resident Dr. Sho Cope MD.    History and Exam:   Chief Complaint   Patient presents with    Foot Swelling     Follow up Bilateral feet swelling    Letter     Patient states her  with [employer] told her if patient's PCP can give her a letter excusing her from work from January - March of 2023, she will be excused from work for the rest of the year (to work for her food stamps). Anxiety     Getting worse - having issues sleeping at night - a lot of family stress - has appointment with [facility] 03/13/23 for assessment - having panic attacks -     Orders     Patient missed appointment for Echo due to transportation - will call to reschedule - patient reports still having palpitations -        Past Medical History:   Diagnosis Date    Hypothyroid      No Known Allergies   I have seen and examined the patient and the key elements of the encounter have been performed by me. BP Readings from Last 3 Encounters:   02/27/23 100/68   12/07/22 134/89   11/21/22 139/85     /68 (Site: Right Lower Arm, Position: Sitting, Cuff Size: Medium Adult) Comment: machine  Pulse 94   Ht 5' 2.99\" (1.6 m)   Wt 256 lb 6.4 oz (116.3 kg)   BMI 45.43 kg/m²   Lab Results   Component Value Date    CHOL 194 09/22/2017    TRIG 133 09/22/2017    HDL 63 09/22/2017    ALT 13 12/01/2022    AST 20 12/01/2022     12/01/2022    K 4.0 12/01/2022    CL 95 (L) 12/01/2022    CREATININE 0.64 12/01/2022    BUN 14 12/01/2022    CO2 31 12/01/2022    TSH 73.27 (H) 12/07/2022    LABA1C 5.7 11/21/2022     Lab Results   Component Value Date    LABALBU 4.3 12/01/2022     No results found for: IRON, TIBC, FERRITIN  Lab Results   Component Value Date    LDLCHOLESTEROL 104 09/22/2017     I agree with the assessment, plan and the diagnosis of    Diagnosis Orders   1.  Primary insomnia  melatonin (RA MELATONIN) 3 MG TABS tablet      2. Hypothyroidism, unspecified type  TSH With Reflex Ft4       . I agree with orders as documented by the resident. More than 25 minutes spent  in face to face encounter with the patient and more than half in counseling. Patient's questions were answered. Patient Voiced understanding to the counseling. Return in about 2 months (around 4/27/2023), or if symptoms worsen or fail to improve, for Depression. Thyroid.    (GC Modifier)-Dr. Evelin Johnson MD

## 2023-02-27 NOTE — PATIENT INSTRUCTIONS
Thank you for letting us take care of you today. We hope all your questions were addressed. If a question was overlooked or something else comes to mind after you return home, please contact a member of your Care Team listed below. Your Care Team at [place] [address] is Team #2  David Bueno DO (Faculty)  Dennie Dad (Faculty)  Domingo Kennedy MD (Resident)  Leia Ferraro MD (Resident)  Demetria Hernandez MD (Resident)  Dony Freeman MD (Resident)  Aure Damon., HELENE Snow.,  MA  Connie Mcneal., ANTHONYN  Nadia Muir., [facility] office)  Fanny Lesches, [address] (Clinical Practice Manager)  Rowdy Savage [facility] (Clinical Pharmacist)     Office phone number: [phone]    If you need to get in right away due to illness, please be advised we have \"Same Day\" appointments available Monday-Friday. Please call us at [phone] option #3 to schedule your \"Same Day\" appointment.

## 2023-02-27 NOTE — PROGRESS NOTES
Visit Information    Have you changed or started any medications since your last visit including any over-the-counter medicines, vitamins, or herbal medicines? no   Have you stopped taking any of your medications? Is so, why? -  no  Are you having any side effects from any of your medications? - no    Have you seen any other physician or provider since your last visit?  no   Have you had any other diagnostic tests since your last visit? yes - Lab   Have you been seen in the emergency room and/or had an admission in a hospital since we last saw you?  no   Have you had your routine dental cleaning in the past 6 months?  no     Do you have an active MyChart account? If no, what is the barrier?   Yes    Patient Care Team:  Diane Woodward MD as PCP - General (Family Medicine)    Medical History Review  Past Medical, Family, and Social History reviewed and does not contribute to the patient presenting condition    Health Maintenance   Topic Date Due    COVID-19 Vaccine (1) Never done    Hepatitis C screen  Never done    DTaP/Tdap/Td vaccine (1 - Tdap) Never done    Colorectal Cancer Screen  Never done    Cervical cancer screen  03/20/2020    Flu vaccine (1) Never done    Lipids  09/22/2022    A1C test (Diabetic or Prediabetic)  11/21/2023    Depression Screen  12/01/2023    HIV screen  Completed    Hepatitis A vaccine  Aged Out    Hib vaccine  Aged Out    Meningococcal (ACWY) vaccine  Aged Out    Pneumococcal 0-64 years Vaccine  Aged Out

## 2023-02-27 NOTE — LETTER
[address]   [facility]  [place] [address]  Phone: [phone]  Fax: [phone]    Hugo Durham MD        February 27, 2023     Patient: Terence Escalona   YOB: 1975   Date of Visit: 2/27/2023       To Whom It May Concern:    I am writing this letter to request my patient be excused from work from Jan to Mar 2023. If you have any questions or concerns, please don't hesitate to call.     Sincerely,        Hugo Durham MD

## 2023-02-28 LAB — T4 FREE SERPL-MCNC: 1.09 NG/DL (ref 0.93–1.7)

## 2023-03-02 DIAGNOSIS — E03.9 HYPOTHYROIDISM, UNSPECIFIED TYPE: Primary | ICD-10-CM

## 2023-03-02 RX ORDER — LEVOTHYROXINE SODIUM 175 UG/1
175 TABLET ORAL DAILY
Qty: 90 TABLET | Refills: 1 | Status: SHIPPED | OUTPATIENT
Start: 2023-03-02

## 2023-03-02 NOTE — PROGRESS NOTES
TSH elevated. Increased dose of synthroid from 150 to 175. Tried calling patient to inform her of uncontrolled hypothyroidism, however, no answer. Will forward chart to MA and requested MA to attempt and call patient.     Electronically signed by Claudean Jacquet, MD on 3/2/2023 at 2:01 PM

## 2023-03-03 ENCOUNTER — TELEPHONE (OUTPATIENT)
Dept: FAMILY MEDICINE CLINIC | Age: 48
End: 2023-03-03

## 2023-03-03 NOTE — TELEPHONE ENCOUNTER
Pt contacted office and stated that she would need a glucometer, provider order test strips, pt has no way of testing. Please order the glucometer and send to pt pharmacy as RX. Thank you.

## 2023-03-05 NOTE — TELEPHONE ENCOUNTER
Patient does not need blood glucose test strips at this time, nor does she need to measure her glucose at home. Please also inform patient of the levothyroxine dose I increased from 150 to 175 and that she should start taking her new 175 dose and discard the 150 dose. Thank you.

## 2023-03-09 ENCOUNTER — HOSPITAL ENCOUNTER (OUTPATIENT)
Dept: NON INVASIVE DIAGNOSTICS | Age: 48
Discharge: HOME | End: 2023-03-09
Payer: COMMERCIAL

## 2023-03-09 DIAGNOSIS — M79.89 BILATERAL SWELLING OF FEET: ICD-10-CM

## 2023-03-09 LAB
LV EF: 55 %
LVEF MODALITY: NORMAL

## 2023-03-09 PROCEDURE — 93306 TTE W/DOPPLER COMPLETE: CPT

## 2023-05-25 ENCOUNTER — TELEMEDICINE (OUTPATIENT)
Dept: PRIMARY CARE CLINIC | Age: 48
End: 2023-05-25
Payer: COMMERCIAL

## 2023-05-25 DIAGNOSIS — B37.2 CANDIDAL SKIN INFECTION: ICD-10-CM

## 2023-05-25 DIAGNOSIS — R30.0 DYSURIA: Primary | ICD-10-CM

## 2023-05-25 PROCEDURE — 99213 OFFICE O/P EST LOW 20 MIN: CPT | Performed by: NURSE PRACTITIONER

## 2023-05-25 RX ORDER — NYSTATIN 100000 [USP'U]/G
POWDER TOPICAL
Qty: 60 G | Refills: 0 | Status: SHIPPED | OUTPATIENT
Start: 2023-05-25

## 2023-05-25 NOTE — PROGRESS NOTES
[x] Appears well-developed and well-nourished [x] No apparent distress      [] Abnormal -     Mental status: [x] Alert and awake  [x] Oriented to person/place/time [x] Able to follow commands    [] Abnormal -     Eyes:   EOM    [x]  Normal    [] Abnormal -   Sclera  [x]  Normal    [] Abnormal -          Discharge [x]  None visible   [] Abnormal -     HENT: [x] Normocephalic, atraumatic  [] Abnormal -   [x] Mouth/Throat: Mucous membranes are moist    External Ears [x] Normal  [] Abnormal -    Neck: [x] No visualized mass [] Abnormal -     Pulmonary/Chest: [x] Respiratory effort normal   [x] No visualized signs of difficulty breathing or respiratory distress        [] Abnormal -      Musculoskeletal:   [x] Normal gait with no signs of ataxia         [x] Normal range of motion of neck        [] Abnormal -     Neurological:        [x] No Facial Asymmetry (Cranial nerve 7 motor function) (limited exam due to video visit)          [x] No gaze palsy        [] Abnormal -          Skin:        [x] No significant exanthematous lesions or discoloration noted on facial skin         [] Abnormal -            Psychiatric:       [x] Normal Affect [] Abnormal -        [x] No Hallucinations    Other pertinent observable physical exam findings:-        On this date 5/25/2023 I have spent 20 minutes reviewing previous notes, test results and face to face (virtual) with the patient discussing the diagnosis and importance of compliance with the treatment plan as well as documenting on the day of the visitBayron Floresgeri Javier, was evaluated through a synchronous (real-time) audio-video encounter. The patient (or guardian if applicable) is aware that this is a billable service, which includes applicable co-pays. This Virtual Visit was conducted with patient's (and/or legal guardian's) consent. Patient identification was verified, and a caregiver was present when appropriate.    The patient was located at Home: [address] [place] [address]

## 2023-06-22 ENCOUNTER — TELEPHONE (OUTPATIENT)
Dept: FAMILY MEDICINE CLINIC | Age: 48
End: 2023-06-22

## 2023-06-22 NOTE — TELEPHONE ENCOUNTER
----- Message from Noah Davis sent at 6/22/2023  2:02 PM EDT -----  Subject: Message to Provider    QUESTIONS  Information for Provider? Patient would like the nurse to call her about   going to a Urgent Care to have a culture done.   ---------------------------------------------------------------------------  --------------  [id number]  [phone]; OK to leave message on voicemail  ---------------------------------------------------------------------------  --------------  SCRIPT ANSWERS  Relationship to Patient?  Self

## 2023-07-27 ENCOUNTER — OFFICE VISIT (OUTPATIENT)
Dept: FAMILY MEDICINE CLINIC | Age: 48
End: 2023-07-27
Payer: COMMERCIAL

## 2023-07-27 ENCOUNTER — HOSPITAL ENCOUNTER (OUTPATIENT)
Age: 48
Setting detail: SPECIMEN
Discharge: HOME | End: 2023-07-27

## 2023-07-27 VITALS
HEART RATE: 89 BPM | SYSTOLIC BLOOD PRESSURE: 115 MMHG | HEIGHT: 63 IN | BODY MASS INDEX: 45.25 KG/M2 | WEIGHT: 255.4 LBS | DIASTOLIC BLOOD PRESSURE: 80 MMHG

## 2023-07-27 DIAGNOSIS — R60.0 BILATERAL LOWER EXTREMITY EDEMA: Primary | ICD-10-CM

## 2023-07-27 DIAGNOSIS — E03.9 HYPOTHYROIDISM, UNSPECIFIED TYPE: ICD-10-CM

## 2023-07-27 LAB
T4 FREE SERPL-MCNC: 0.6 NG/DL (ref 0.9–1.7)
TSH SERPL DL<=0.05 MIU/L-ACNC: 49.51 UIU/ML (ref 0.3–5)

## 2023-07-27 PROCEDURE — 99213 OFFICE O/P EST LOW 20 MIN: CPT

## 2023-07-27 RX ORDER — ALPRAZOLAM 1 MG/1
TABLET ORAL
COMMUNITY
Start: 2023-07-03

## 2023-07-27 ASSESSMENT — ENCOUNTER SYMPTOMS
RESPIRATORY NEGATIVE: 1
GASTROINTESTINAL NEGATIVE: 1

## 2023-07-27 NOTE — PROGRESS NOTES
Visit Information    Have you changed or started any medications since your last visit including any over-the-counter medicines, vitamins, or herbal medicines? no   Have you stopped taking any of your medications? Is so, why? -  no  Are you having any side effects from any of your medications? - no    Have you seen any other physician or provider since your last visit? Virtual Visit,    Have you had any other diagnostic tests since your last visit? yes - Echo, Labs   Have you been seen in the emergency room and/or had an admission in a hospital since we last saw you?  no   Have you had your routine dental cleaning in the past 6 months?  no     Do you have an active MyChart account? If no, what is the barrier?   Yes    Patient Care Team:  Kareem Reyes MD as PCP - General (Family Medicine)    Medical History Review  Past Medical, Family, and Social History reviewed and does not contribute to the patient presenting condition    Health Maintenance   Topic Date Due    COVID-19 Vaccine (1) Never done    DTaP/Tdap/Td vaccine (1 - Tdap) Never done    Colorectal Cancer Screen  Never done    Cervical cancer screen  03/20/2020    Flu vaccine (1) 08/01/2023    A1C test (Diabetic or Prediabetic)  11/21/2023    Depression Monitoring  02/27/2024    Lipids  02/27/2028    Hepatitis C screen  Completed    HIV screen  Completed    Hepatitis A vaccine  Aged Out    Hib vaccine  Aged Out    Meningococcal (ACWY) vaccine  Aged Out    Pneumococcal 0-64 years Vaccine  Aged Out    Depression Screen  Discontinued

## 2023-07-27 NOTE — PATIENT INSTRUCTIONS
Thank you for letting us take care of you today. We hope all your questions were addressed. If a question was overlooked or something else comes to mind after you return home, please contact a member of your Care Team listed below. Your Care Team at [address] [place] [address] is Team #2  Andre Minaya M.D. (Faculty)  Mallissa Siemens, (Resident)  Nitesh Colmenares, (Resident)  Jacqueline Paulino, (Resident)  Bean Kang, (Resident)  Adry Palafox, (Resident)  Silvia Jones, [address] [place] [address], [facility]  Janelle Hammans,  [facility]  Tamela Altman, [facility]  Jennifer Bella, [facility]  Leidy Walters, [facility]  LaJoyce Sharyle Aguas) [place], [place] ([address])  Kimberly Loza [facility] (Clinical Pharmacist)     Office phone number: [phone]    If you need to get in right away due to illness, please be advised we have \"Same Day\" appointments available Monday-Friday. Please call us at [phone] option #3 to schedule your \"Same Day\" appointment.

## 2023-08-07 ENCOUNTER — TELEPHONE (OUTPATIENT)
Dept: FAMILY MEDICINE CLINIC | Age: 48
End: 2023-08-07

## 2023-08-07 NOTE — TELEPHONE ENCOUNTER
----- Message from Perla Loera MD sent at 8/4/2023  5:52 PM EDT -----  Lab results were seen, TSH is elevated, Patient has an upcoming appointment on 8/28/23. Please call patient and notify about results. She is encouraged not to miss her upcoming appointment to discuss medication dosage adjustment or schedule earlier with PCP if required. Thank you!

## 2023-08-07 NOTE — TELEPHONE ENCOUNTER
Writer spoke to pt and update TSH per provider elevated. Pt is to come to appt on 8-28-23. Pt voiced understanding.

## 2023-08-28 ENCOUNTER — TELEMEDICINE (OUTPATIENT)
Dept: PRIMARY CARE CLINIC | Age: 48
End: 2023-08-28
Payer: COMMERCIAL

## 2023-08-28 ENCOUNTER — TELEPHONE (OUTPATIENT)
Dept: FAMILY MEDICINE CLINIC | Age: 48
End: 2023-08-28

## 2023-08-28 DIAGNOSIS — R30.0 DYSURIA: Primary | ICD-10-CM

## 2023-08-28 PROCEDURE — 99213 OFFICE O/P EST LOW 20 MIN: CPT | Performed by: NURSE PRACTITIONER

## 2023-08-29 NOTE — TELEPHONE ENCOUNTER
Writer called patient to inform her of Dr. Gaby Moe message, Héctor Chau tried to see if a sooner appointment was available and there wasn't. Patient has a follow up on 9/15/23.

## 2023-08-31 ENCOUNTER — HOSPITAL ENCOUNTER (OUTPATIENT)
Age: 48
Setting detail: SPECIMEN
Discharge: HOME | End: 2023-08-31

## 2023-08-31 ENCOUNTER — OFFICE VISIT (OUTPATIENT)
Dept: FAMILY MEDICINE CLINIC | Age: 48
End: 2023-08-31
Payer: COMMERCIAL

## 2023-08-31 VITALS
BODY MASS INDEX: 41.04 KG/M2 | HEART RATE: 115 BPM | SYSTOLIC BLOOD PRESSURE: 110 MMHG | DIASTOLIC BLOOD PRESSURE: 68 MMHG | HEIGHT: 63 IN | WEIGHT: 231.6 LBS

## 2023-08-31 DIAGNOSIS — R39.11 URINARY HESITANCY: Primary | ICD-10-CM

## 2023-08-31 LAB
BILIRUBIN, POC: NORMAL
BLOOD URINE, POC: NEGATIVE
CLARITY, POC: CLEAR
COLOR, POC: YELLOW
GLUCOSE URINE, POC: NEGATIVE
KETONES, POC: NEGATIVE
LEUKOCYTE EST, POC: NEGATIVE
NITRITE, POC: NEGATIVE
PH, POC: 5.5
PROTEIN, POC: NEGATIVE
SPECIFIC GRAVITY, POC: 1.03
UROBILINOGEN, POC: 1

## 2023-08-31 PROCEDURE — 81003 URINALYSIS AUTO W/O SCOPE: CPT

## 2023-08-31 NOTE — PATIENT INSTRUCTIONS
Thank you for letting us take care of you today. We hope all your questions were addressed. If a question was overlooked or something else comes to mind after you return home, please contact a member of your Care Team listed below. Your Care Team at [address] [place] [address] is Team #4  Anuj Jaramillo (Faculty)  Margarito Amaro (Resident)  Lynn Bianchi (Resident)  Manuel De La Garza (Resident)  Lokesh Arango (Resident)  Jacinda Prieto (Resident)  Korey Villarreal, [address] [place] [address], [facility]  Juan Caicedo, [address] [facility], [facility]  Ree Graham, [facility]  Piero Alonso, [facility]  Charlyn Mortimer, [facility]  Jaz Sanders, [facility]  Rose Julian) [place], [place] (Clinical Practice Manager)  Burgess Coleman [facility] (Clinical Pharmacist)       Office phone number: [phone]    If you need to get in right away due to illness, please be advised we have \"Same Day\" appointments available Monday-Friday. Please call us at [phone] option #3 to schedule your \"Same Day\" appointment.

## 2023-08-31 NOTE — PROGRESS NOTES
Attending Physician Statement  I have discussed the care of LoriCarswellincluding pertinent history and exam findings,  with the resident. I have reviewed the key elements of all parts of the encounter with the resident. I agree with the assessment, plan and orders as documented by the resident.   (GE Modifier)    Urinary hesitancy- UA and C/S  Bilat lymphedema- FU with Vascular

## 2023-08-31 NOTE — PROGRESS NOTES
Visit Information    Have you changed or started any medications since your last visit including any over-the-counter medicines, vitamins, or herbal medicines? no   Have you stopped taking any of your medications? Is so, why? -  no  Are you having any side effects from any of your medications? - no    Have you seen any other physician or provider since your last visit?  no   Have you had any other diagnostic tests since your last visit?  no   Have you been seen in the emergency room and/or had an admission in a hospital since we last saw you?  no   Have you had your routine dental cleaning in the past 6 months?  no     Do you have an active MyChart account? If no, what is the barrier?   Yes    Patient Care Team:  Julia Higginbotham MD as PCP - General (Family Medicine)    Medical History Review  Past Medical, Family, and Social History reviewed and does not contribute to the patient presenting condition    Health Maintenance   Topic Date Due    COVID-19 Vaccine (1) Never done    DTaP/Tdap/Td vaccine (1 - Tdap) Never done    Colorectal Cancer Screen  Never done    Cervical cancer screen  03/20/2020    Flu vaccine (1) Never done    A1C test (Diabetic or Prediabetic)  11/21/2023    Depression Monitoring  02/27/2024    Lipids  02/27/2028    Hepatitis C screen  Completed    HIV screen  Completed    Hepatitis A vaccine  Aged Out    Hib vaccine  Aged Out    Meningococcal (ACWY) vaccine  Aged Out    Pneumococcal 0-64 years Vaccine  Aged Out    Depression Screen  Discontinued

## 2023-08-31 NOTE — PROGRESS NOTES
Established Pt     Here for same day complain of urinary hesistancy and feeling of incomplete em prying of the bladder, as per pt has previous hx of UTIs. Review of Systems   Genitourinary:  Positive for difficulty urinating. Negative for decreased urine volume, dysuria, flank pain, frequency, hematuria, pelvic pain and urgency. Vitals:    08/31/23 1617   BP: 110/68   Pulse: (!) 115       Physical Exam  Cardiovascular:      Rate and Rhythm: Normal rate and regular rhythm. Pulses: Normal pulses. Heart sounds: Normal heart sounds. Pulmonary:      Effort: Pulmonary effort is normal.      Breath sounds: Normal breath sounds. Abdominal:      General: There is no distension. Palpations: Abdomen is soft. Tenderness: There is no abdominal tenderness. There is no guarding or rebound. Musculoskeletal:      Right lower leg: Edema present. Left lower leg: Edema present. Diagnosis Orders   1.  Urinary hesitancy            Plan:  1.)  POC UA -ve , will send for culture   2.)  Counseled on adequate PO intake  3.)  Follow up on Urine culture  4.)  Return to the clinic if symptoms worsen or did not improve, consider bladder US

## 2023-09-01 LAB
MICROORGANISM SPEC CULT: NORMAL
SPECIMEN DESCRIPTION: NORMAL

## 2023-09-06 ENCOUNTER — TELEPHONE (OUTPATIENT)
Dept: FAMILY MEDICINE CLINIC | Age: 48
End: 2023-09-06

## 2023-10-02 ENCOUNTER — OFFICE VISIT (OUTPATIENT)
Dept: FAMILY MEDICINE CLINIC | Age: 48
End: 2023-10-02
Payer: COMMERCIAL

## 2023-10-02 VITALS
BODY MASS INDEX: 42.21 KG/M2 | HEIGHT: 63 IN | HEART RATE: 101 BPM | OXYGEN SATURATION: 96 % | WEIGHT: 238.2 LBS | DIASTOLIC BLOOD PRESSURE: 69 MMHG | SYSTOLIC BLOOD PRESSURE: 112 MMHG | TEMPERATURE: 97.9 F

## 2023-10-02 DIAGNOSIS — Z59.41 FOOD INSECURITY: ICD-10-CM

## 2023-10-02 DIAGNOSIS — M25.562 ACUTE PAIN OF BOTH KNEES: ICD-10-CM

## 2023-10-02 DIAGNOSIS — R73.03 PREDIABETES: ICD-10-CM

## 2023-10-02 DIAGNOSIS — M25.561 ACUTE PAIN OF BOTH KNEES: ICD-10-CM

## 2023-10-02 DIAGNOSIS — E03.9 HYPOTHYROIDISM, UNSPECIFIED TYPE: Primary | ICD-10-CM

## 2023-10-02 LAB — HBA1C MFR BLD: 5.8 %

## 2023-10-02 PROCEDURE — 83036 HEMOGLOBIN GLYCOSYLATED A1C: CPT

## 2023-10-02 PROCEDURE — 99213 OFFICE O/P EST LOW 20 MIN: CPT

## 2023-10-02 RX ORDER — AMITRIPTYLINE HYDROCHLORIDE 50 MG/1
50 TABLET, FILM COATED ORAL
COMMUNITY
Start: 2023-09-05

## 2023-10-02 RX ORDER — ONDANSETRON 4 MG/1
TABLET, FILM COATED ORAL
COMMUNITY
Start: 2023-09-11

## 2023-10-02 RX ORDER — LEVOTHYROXINE SODIUM 0.2 MG/1
200 TABLET ORAL DAILY
Qty: 90 TABLET | Refills: 1 | Status: SHIPPED | OUTPATIENT
Start: 2023-10-02

## 2023-10-02 SDOH — ECONOMIC STABILITY - FOOD INSECURITY: FOOD INSECURITY: Z59.41

## 2023-10-02 ASSESSMENT — ENCOUNTER SYMPTOMS
SHORTNESS OF BREATH: 0
DIARRHEA: 0
NAUSEA: 0
VOMITING: 0
SORE THROAT: 0
ABDOMINAL PAIN: 0
CONSTIPATION: 0
COUGH: 0

## 2023-10-19 ENCOUNTER — TELEPHONE (OUTPATIENT)
Dept: FAMILY MEDICINE CLINIC | Age: 48
End: 2023-10-19

## 2023-10-19 NOTE — TELEPHONE ENCOUNTER
Aydin Berg was contacted by Violeta Hussein MA, bassam Vaughan, regarding a Social Determinants of Health referral.     A message was left with the writer's contact information. Follow-up attempt.

## 2023-12-01 ENCOUNTER — TELEPHONE (OUTPATIENT)
Dept: FAMILY MEDICINE CLINIC | Age: 48
End: 2023-12-01

## 2023-12-01 NOTE — TELEPHONE ENCOUNTER
----- Message from Orion Gaviria sent at 11/29/2023  2:35 PM EST -----  Subject: Message to Provider    QUESTIONS  Information for Provider? Brenda Pily is requesting that pcp write her a   note for her job regarding thyroid medicine and how it makes her tired   (side effects). She states that she can come and pick it up. she is   requesting if she can have it by no later than the end of week. Please f/u   with patient to let her know.   ---------------------------------------------------------------------------  --------------  Demi Valdez CYF  [phone]; OK to leave message on voicemail  ---------------------------------------------------------------------------  --------------  SCRIPT ANSWERS  Relationship to Patient?  Self

## 2023-12-01 NOTE — TELEPHONE ENCOUNTER
Patient has appt on 12/5/2023 with Dr. Ирина Barajas. Please request patient to complete blood work prior to coming in and to discuss further concerns then. Tiredness may be due to uncontrolled hypothyroidism.  Thank you

## 2023-12-04 NOTE — TELEPHONE ENCOUNTER
Writer called patient to ask her to have lab work done before follow up tomorrow, patient phone was busy.

## 2024-07-20 ENCOUNTER — HOSPITAL ENCOUNTER (EMERGENCY)
Age: 49
Discharge: HOME | End: 2024-07-20
Attending: EMERGENCY MEDICINE
Payer: COMMERCIAL

## 2024-07-20 ENCOUNTER — APPOINTMENT (OUTPATIENT)
Dept: GENERAL RADIOLOGY | Age: 49
End: 2024-07-20
Payer: COMMERCIAL

## 2024-07-20 VITALS
HEIGHT: 63 IN | HEART RATE: 93 BPM | BODY MASS INDEX: 38.09 KG/M2 | SYSTOLIC BLOOD PRESSURE: 143 MMHG | DIASTOLIC BLOOD PRESSURE: 94 MMHG | TEMPERATURE: 98.1 F | OXYGEN SATURATION: 94 % | WEIGHT: 215 LBS | RESPIRATION RATE: 18 BRPM

## 2024-07-20 DIAGNOSIS — J18.9 PNEUMONIA OF LEFT LOWER LOBE DUE TO INFECTIOUS ORGANISM: Primary | ICD-10-CM

## 2024-07-20 PROCEDURE — 94761 N-INVAS EAR/PLS OXIMETRY MLT: CPT

## 2024-07-20 PROCEDURE — 6370000000 HC RX 637 (ALT 250 FOR IP): Performed by: EMERGENCY MEDICINE

## 2024-07-20 PROCEDURE — 94640 AIRWAY INHALATION TREATMENT: CPT

## 2024-07-20 PROCEDURE — 99283 EMERGENCY DEPT VISIT LOW MDM: CPT

## 2024-07-20 PROCEDURE — 71046 X-RAY EXAM CHEST 2 VIEWS: CPT

## 2024-07-20 RX ORDER — AZITHROMYCIN 250 MG/1
TABLET, FILM COATED ORAL
Qty: 1 PACKET | Refills: 0 | Status: SHIPPED | OUTPATIENT
Start: 2024-07-20 | End: 2024-07-24

## 2024-07-20 RX ORDER — ALBUTEROL SULFATE 90 UG/1
2 AEROSOL, METERED RESPIRATORY (INHALATION) 4 TIMES DAILY PRN
Qty: 18 G | Refills: 0 | Status: SHIPPED | OUTPATIENT
Start: 2024-07-20

## 2024-07-20 RX ORDER — IPRATROPIUM BROMIDE AND ALBUTEROL SULFATE 2.5; .5 MG/3ML; MG/3ML
1 SOLUTION RESPIRATORY (INHALATION) ONCE
Status: COMPLETED | OUTPATIENT
Start: 2024-07-20 | End: 2024-07-20

## 2024-07-20 RX ORDER — GUAIFENESIN/DEXTROMETHORPHAN 100-10MG/5
5 SYRUP ORAL 3 TIMES DAILY PRN
Qty: 120 ML | Refills: 0 | Status: SHIPPED | OUTPATIENT
Start: 2024-07-20 | End: 2024-07-30

## 2024-07-20 RX ORDER — AZITHROMYCIN 250 MG/1
500 TABLET, FILM COATED ORAL ONCE
Status: COMPLETED | OUTPATIENT
Start: 2024-07-20 | End: 2024-07-20

## 2024-07-20 RX ADMIN — AZITHROMYCIN DIHYDRATE 500 MG: 250 TABLET ORAL at 14:29

## 2024-07-20 RX ADMIN — IPRATROPIUM BROMIDE AND ALBUTEROL SULFATE 1 DOSE: 2.5; .5 SOLUTION RESPIRATORY (INHALATION) at 13:39

## 2024-07-20 ASSESSMENT — ENCOUNTER SYMPTOMS
WHEEZING: 1
ANAL BLEEDING: 1
COUGH: 1
SHORTNESS OF BREATH: 1

## 2024-07-20 ASSESSMENT — LIFESTYLE VARIABLES
HOW OFTEN DO YOU HAVE A DRINK CONTAINING ALCOHOL: NEVER
HOW MANY STANDARD DRINKS CONTAINING ALCOHOL DO YOU HAVE ON A TYPICAL DAY: PATIENT DOES NOT DRINK

## 2024-07-20 ASSESSMENT — PAIN SCALES - GENERAL: PAINLEVEL_OUTOF10: 0

## 2024-07-20 ASSESSMENT — PAIN - FUNCTIONAL ASSESSMENT: PAIN_FUNCTIONAL_ASSESSMENT: 0-10

## 2024-07-21 NOTE — ED PROVIDER NOTES
[facility] ED  EMERGENCY DEPARTMENT ENCOUNTER      Pt Name: Suzi Barnes  MRN: [id number]  Birthdate 1975  Date of evaluation: 7/20/24      CHIEF COMPLAINT       Chief Complaint   Patient presents with    Cough    Wheezing         HISTORY OF PRESENT ILLNESS   HPI 48 y.o. female presents with c/o cough and wheezing.  Patient reports that she has been sick for about the last 5 days.  She denies any history of asthma or COPD. + sick contacts.  No chest pain no leg edema, she is concerned she might be getting pneumonia    REVIEW OF SYSTEMS       Review of Systems   Constitutional:  Positive for fatigue.   Respiratory:  Positive for cough, shortness of breath and wheezing.    Cardiovascular:  Negative for chest pain and leg swelling.   Gastrointestinal:  Positive for anal bleeding.       \  PAST MEDICAL HISTORY     Past Medical History:   Diagnosis Date    Hypothyroid        SURGICAL HISTORY     History reviewed. No pertinent surgical history.    CURRENT MEDICATIONS       Discharge Medication List as of 7/20/2024  2:24 PM        CONTINUE these medications which have NOT CHANGED    Details   ondansetron (ZOFRAN) 4 MG tablet TAKE 1 TABLET BY MOUTH EVERY DAY AS NEEDED FOR NAUSEAHistorical Med      amitriptyline (ELAVIL) 50 MG tablet Take 1 tablet by mouthHistorical Med      levothyroxine (SYNTHROID) 200 MCG tablet Take 1 tablet by mouth daily, Disp-90 tablet, R-1Normal      ALPRAZolam (XANAX) 1 MG tablet Historical Med      nystatin (MYCOSTATIN) 576825 UNIT/GM powder Apply topically 4 times daily., Disp-60 g, R-0, Normal      melatonin (RA MELATONIN) 3 MG TABS tablet Take 1 tablet by mouth nightly as needed (Insomnia), Disp-30 tablet, R-3Normal      citalopram (CELEXA) 10 MG/5ML solution Take 10 mLs by mouth daily Unsure dose . Filled by physician at [facility].Historical Med      buprenorphine-naloxone (SUBOXONE) 8-2 MG FILM SL film PLACE 1 FILM UNDER TONGUE TWICE A DAY AS DIRECTED, R-0Historical Med

## 2024-08-13 DIAGNOSIS — E03.9 HYPOTHYROIDISM, UNSPECIFIED TYPE: ICD-10-CM

## 2024-08-13 RX ORDER — LEVOTHYROXINE SODIUM 0.2 MG/1
200 TABLET ORAL DAILY
Qty: 90 TABLET | Refills: 1 | Status: SHIPPED | OUTPATIENT
Start: 2024-08-13

## 2024-08-13 NOTE — TELEPHONE ENCOUNTER
Patient called asking for a refill on her Levothyroxine. She is out and has an upcoming appt on 08/19/24 to establish new provider. Can enough be sent to hold her until her appt.

## 2024-08-19 ENCOUNTER — OFFICE VISIT (OUTPATIENT)
Dept: FAMILY MEDICINE CLINIC | Age: 49
End: 2024-08-19
Payer: COMMERCIAL

## 2024-08-19 VITALS
HEART RATE: 76 BPM | WEIGHT: 236 LBS | BODY MASS INDEX: 41.82 KG/M2 | HEIGHT: 63 IN | DIASTOLIC BLOOD PRESSURE: 84 MMHG | SYSTOLIC BLOOD PRESSURE: 124 MMHG

## 2024-08-19 DIAGNOSIS — M79.89 SWELLING OF RIGHT FOOT: ICD-10-CM

## 2024-08-19 DIAGNOSIS — E03.9 HYPOTHYROIDISM, UNSPECIFIED TYPE: ICD-10-CM

## 2024-08-19 DIAGNOSIS — Z12.31 ENCOUNTER FOR SCREENING MAMMOGRAM FOR BREAST CANCER: ICD-10-CM

## 2024-08-19 DIAGNOSIS — R73.03 PREDIABETES: Primary | ICD-10-CM

## 2024-08-19 DIAGNOSIS — Z12.11 COLON CANCER SCREENING: ICD-10-CM

## 2024-08-19 DIAGNOSIS — K21.9 GASTROESOPHAGEAL REFLUX DISEASE WITHOUT ESOPHAGITIS: ICD-10-CM

## 2024-08-19 LAB — HBA1C MFR BLD: 6 %

## 2024-08-19 PROCEDURE — 99213 OFFICE O/P EST LOW 20 MIN: CPT

## 2024-08-19 PROCEDURE — G8417 CALC BMI ABV UP PARAM F/U: HCPCS

## 2024-08-19 PROCEDURE — 83036 HEMOGLOBIN GLYCOSYLATED A1C: CPT

## 2024-08-19 PROCEDURE — 3017F COLORECTAL CA SCREEN DOC REV: CPT

## 2024-08-19 PROCEDURE — 1036F TOBACCO NON-USER: CPT

## 2024-08-19 PROCEDURE — G8427 DOCREV CUR MEDS BY ELIG CLIN: HCPCS

## 2024-08-19 RX ORDER — NAPROXEN 500 MG/1
500 TABLET ORAL 2 TIMES DAILY PRN
Qty: 60 TABLET | Refills: 0 | Status: SHIPPED | OUTPATIENT
Start: 2024-08-19

## 2024-08-19 RX ORDER — FAMOTIDINE 20 MG/1
20 TABLET, FILM COATED ORAL 2 TIMES DAILY
Qty: 60 TABLET | Refills: 3 | Status: SHIPPED | OUTPATIENT
Start: 2024-08-19

## 2024-08-19 SDOH — ECONOMIC STABILITY: FOOD INSECURITY: WITHIN THE PAST 12 MONTHS, THE FOOD YOU BOUGHT JUST DIDN'T LAST AND YOU DIDN'T HAVE MONEY TO GET MORE.: NEVER TRUE

## 2024-08-19 SDOH — ECONOMIC STABILITY: FOOD INSECURITY: WITHIN THE PAST 12 MONTHS, YOU WORRIED THAT YOUR FOOD WOULD RUN OUT BEFORE YOU GOT MONEY TO BUY MORE.: NEVER TRUE

## 2024-08-19 SDOH — ECONOMIC STABILITY: INCOME INSECURITY: HOW HARD IS IT FOR YOU TO PAY FOR THE VERY BASICS LIKE FOOD, HOUSING, MEDICAL CARE, AND HEATING?: NOT HARD AT ALL

## 2024-08-19 ASSESSMENT — PATIENT HEALTH QUESTIONNAIRE - PHQ9
SUM OF ALL RESPONSES TO PHQ9 QUESTIONS 1 & 2: 6
8. MOVING OR SPEAKING SO SLOWLY THAT OTHER PEOPLE COULD HAVE NOTICED. OR THE OPPOSITE, BEING SO FIGETY OR RESTLESS THAT YOU HAVE BEEN MOVING AROUND A LOT MORE THAN USUAL: NOT AT ALL
6. FEELING BAD ABOUT YOURSELF - OR THAT YOU ARE A FAILURE OR HAVE LET YOURSELF OR YOUR FAMILY DOWN: NEARLY EVERY DAY
SUM OF ALL RESPONSES TO PHQ QUESTIONS 1-9: 22
10. IF YOU CHECKED OFF ANY PROBLEMS, HOW DIFFICULT HAVE THESE PROBLEMS MADE IT FOR YOU TO DO YOUR WORK, TAKE CARE OF THINGS AT HOME, OR GET ALONG WITH OTHER PEOPLE: VERY DIFFICULT
9. THOUGHTS THAT YOU WOULD BE BETTER OFF DEAD, OR OF HURTING YOURSELF: SEVERAL DAYS
5. POOR APPETITE OR OVEREATING: NEARLY EVERY DAY
SUM OF ALL RESPONSES TO PHQ QUESTIONS 1-9: 22
2. FEELING DOWN, DEPRESSED OR HOPELESS: NEARLY EVERY DAY
SUM OF ALL RESPONSES TO PHQ QUESTIONS 1-9: 21
7. TROUBLE CONCENTRATING ON THINGS, SUCH AS READING THE NEWSPAPER OR WATCHING TELEVISION: NEARLY EVERY DAY
3. TROUBLE FALLING OR STAYING ASLEEP: NEARLY EVERY DAY
1. LITTLE INTEREST OR PLEASURE IN DOING THINGS: NEARLY EVERY DAY
SUM OF ALL RESPONSES TO PHQ QUESTIONS 1-9: 22
4. FEELING TIRED OR HAVING LITTLE ENERGY: NEARLY EVERY DAY

## 2024-08-19 ASSESSMENT — ENCOUNTER SYMPTOMS
RHINORRHEA: 0
COUGH: 0
SHORTNESS OF BREATH: 0
ABDOMINAL PAIN: 0
NAUSEA: 0
DIARRHEA: 0
BACK PAIN: 0
SORE THROAT: 0

## 2024-08-19 ASSESSMENT — COLUMBIA-SUICIDE SEVERITY RATING SCALE - C-SSRS
1. WITHIN THE PAST MONTH, HAVE YOU WISHED YOU WERE DEAD OR WISHED YOU COULD GO TO SLEEP AND NOT WAKE UP?: YES
2. HAVE YOU ACTUALLY HAD ANY THOUGHTS OF KILLING YOURSELF?: NO
6. HAVE YOU EVER DONE ANYTHING, STARTED TO DO ANYTHING, OR PREPARED TO DO ANYTHING TO END YOUR LIFE?: NO

## 2024-08-19 NOTE — PATIENT INSTRUCTIONS
Thank you for letting us take care of you today. We hope all your questions were addressed. If a question was overlooked or something else comes to mind after you return home, please contact a member of your Care Team listed below.      Your Care Team at [facility] is Team #1  Mratha Sow M.D. (Faculty)  Isauro Rodriguez M.D. (Resident)  John Stark D.O. (Resident)  aTy Holland M.D. (Resident)  Lana Palma M.D. (Resident)  Virgen Hsu, [facility]  Ludin Lawrence, [facility]  Estee Muller, [facility]  Maday Cowan, [facility]  Sheridan Delong, [facility]  Sheree Ramirez, [facility]  Lena Mohamud, [facility]  Rose (LJ) Ramya,   [facility] (Clinical Pharmacist)     Office phone number: [phone]    If you need to get in right away due to illness, please be advised we have \"Same Day\" appointments available Monday-Friday. Please call us at [phone] option #3 to schedule your \"Same Day\" appointment.

## 2024-08-19 NOTE — PROGRESS NOTES
Attending Physician Statement  I  have discussed the care of Suzi Barnes including pertinent history and exam findings with the resident. I agree with the assessment, plan and orders as documented by the resident.      /84 (Site: Right Upper Arm, Position: Sitting, Cuff Size: Medium Adult)   Pulse 76   Ht 1.6 m (5' 2.99\")   Wt 107 kg (236 lb)   BMI 41.82 kg/m²    BP Readings from Last 3 Encounters:   08/19/24 124/84   07/20/24 (!) 143/94   10/02/23 112/69     Wt Readings from Last 3 Encounters:   08/19/24 107 kg (236 lb)   07/20/24 97.5 kg (215 lb)   10/02/23 108 kg (238 lb 3.2 oz)          Diagnosis Orders   1. Prediabetes  POCT glycosylated hemoglobin (Hb A1C)      2. Swelling of right foot  D-Dimer, Quantitative    Vascular duplex lower extremity venous right    Brain Natriuretic Peptide    naproxen (NAPROSYN) 500 MG tablet      3. Hypothyroidism, unspecified type  TSH With Reflex Ft4    T3, Free      4. Encounter for screening mammogram for breast cancer  JOY DIGITAL SCREEN W OR WO CAD BILATERAL      5. Colon cancer screening  Fecal DNA Colorectal cancer screening (Cologuard)      6. Gastroesophageal reflux disease without esophagitis  TSH With Reflex Ft4    famotidine (PEPCID) 20 MG tablet              Gerald Charlton DO 8/20/2024 12:04 PM

## 2024-08-19 NOTE — PROGRESS NOTES
Visit Information    Have you changed or started any medications since your last visit including any over-the-counter medicines, vitamins, or herbal medicines? no   Are you having any side effects from any of your medications? -  no  Have you stopped taking any of your medications? Is so, why? -  no    Have you seen any other physician or provider since your last visit? No  Have you had any other diagnostic tests since your last visit? Yes - Records Obtained  Have you been seen in the emergency room and/or had an admission to a hospital since we last saw you? Yes - Records Obtained  Have you had your routine dental cleaning in the past 6 months? no    Have you activated your [employer] account? If not, what are your barriers? Yes     Patient Care Team:  Nathalie Mayen DO as PCP - General (Family Medicine)    Medical History Review  Past Medical, Family, and Social History reviewed and does not contribute to the patient presenting condition    Health Maintenance   Topic Date Due    Hepatitis B vaccine (1 of 3 - 19+ 3-dose series) Never done    DTaP/Tdap/Td vaccine (1 - Tdap) Never done    Breast cancer screen  Never done    Colorectal Cancer Screen  Never done    Cervical cancer screen  03/20/2020    COVID-19 Vaccine (1 - 2023-24 season) Never done    Shingles vaccine (1 of 2) Never done    Depression Monitoring  02/27/2024    Flu vaccine (1) Never done    A1C test (Diabetic or Prediabetic)  10/02/2024    Lipids  02/27/2028    Hepatitis C screen  Completed    HIV screen  Completed    Hepatitis A vaccine  Aged Out    Hib vaccine  Aged Out    Polio vaccine  Aged Out    Meningococcal (ACWY) vaccine  Aged Out    Pneumococcal 0-64 years Vaccine  Aged Out    Depression Screen  Discontinued

## 2024-08-19 NOTE — PROGRESS NOTES
[facility] Residency Program - Outpatient Note      Subjective:    Suzi Barnes is a 48 y.o. female with  has a past medical history of Hypothyroid.    Presented to the office today for:  Chief Complaint   Patient presents with    Establish Care     New to provider     Blood Sugar Problem     Discuss patient sugar being low       HPI    48-year-old female with past medical history of hypothyroidism, previous history of substance use disorder, currently on Suboxone presenting today to establish care.  - Patient states that she has been having right lower extremity, first 2 digits have been numbness and tingling that has been ongoing since October of last year.  Patient also states that she has been having some swelling in that extremity as well.  The pain has been getting worse.  Patient states that previously she was on gabapentin that helped with the pain.  However currently patient is already on a level as well as Xanax, that is prescribed by her psychiatrist at [place].      Right two toes   Most likely neuropathic pain   Was previously gabapentin which helped  Is on xanax   Can start on cymbalta or pregabalin         Nausea   4 x a week  After eating          Review of Systems   Constitutional:  Negative for chills and fever.   HENT:  Negative for rhinorrhea and sore throat.    Eyes:  Negative for visual disturbance.   Respiratory:  Negative for cough and shortness of breath.    Cardiovascular:  Negative for chest pain and leg swelling.   Gastrointestinal:  Negative for abdominal pain, diarrhea and nausea.   Genitourinary:  Negative for dysuria and hematuria.   Musculoskeletal:  Negative for back pain.   Skin:  Negative for rash.   Neurological:  Negative for numbness and headaches.   Psychiatric/Behavioral:  Negative for agitation.                  The patient has a No family history on file.    Objective:    /84 (Site: Right Upper Arm, Position: Sitting, Cuff Size:

## 2024-10-04 ENCOUNTER — OFFICE VISIT (OUTPATIENT)
Dept: FAMILY MEDICINE CLINIC | Age: 49
End: 2024-10-04
Payer: COMMERCIAL

## 2024-10-04 ENCOUNTER — HOSPITAL ENCOUNTER (OUTPATIENT)
Age: 49
Setting detail: SPECIMEN
Discharge: HOME | End: 2024-10-04

## 2024-10-04 VITALS
HEART RATE: 91 BPM | BODY MASS INDEX: 41.82 KG/M2 | TEMPERATURE: 98.3 F | WEIGHT: 236 LBS | DIASTOLIC BLOOD PRESSURE: 89 MMHG | SYSTOLIC BLOOD PRESSURE: 133 MMHG

## 2024-10-04 DIAGNOSIS — K59.00 CONSTIPATION, UNSPECIFIED CONSTIPATION TYPE: ICD-10-CM

## 2024-10-04 DIAGNOSIS — K21.9 GASTROESOPHAGEAL REFLUX DISEASE WITHOUT ESOPHAGITIS: ICD-10-CM

## 2024-10-04 DIAGNOSIS — M25.552 ACUTE HIP PAIN, LEFT: Primary | ICD-10-CM

## 2024-10-04 DIAGNOSIS — E03.9 HYPOTHYROIDISM, UNSPECIFIED TYPE: ICD-10-CM

## 2024-10-04 DIAGNOSIS — M79.89 SWELLING OF RIGHT FOOT: ICD-10-CM

## 2024-10-04 LAB
BNP SERPL-MCNC: 41 PG/ML (ref 0–300)
D DIMER PPP FEU-MCNC: 1.72 UG/ML FEU (ref 0–0.57)
T3FREE SERPL-MCNC: 1.9 PG/ML (ref 2–4.4)
T4 FREE SERPL-MCNC: 0.6 NG/DL (ref 0.92–1.68)
TSH SERPL DL<=0.05 MIU/L-ACNC: 36.4 UIU/ML (ref 0.27–4.2)

## 2024-10-04 PROCEDURE — 90656 IIV3 VACC NO PRSV 0.5 ML IM: CPT

## 2024-10-04 PROCEDURE — 99213 OFFICE O/P EST LOW 20 MIN: CPT

## 2024-10-04 PROCEDURE — G8427 DOCREV CUR MEDS BY ELIG CLIN: HCPCS

## 2024-10-04 PROCEDURE — G8482 FLU IMMUNIZE ORDER/ADMIN: HCPCS

## 2024-10-04 PROCEDURE — 1036F TOBACCO NON-USER: CPT

## 2024-10-04 PROCEDURE — G8417 CALC BMI ABV UP PARAM F/U: HCPCS

## 2024-10-04 PROCEDURE — 3017F COLORECTAL CA SCREEN DOC REV: CPT

## 2024-10-04 RX ORDER — LEVOTHYROXINE SODIUM 200 UG/1
200 TABLET ORAL DAILY
Qty: 90 TABLET | Refills: 1 | Status: SHIPPED | OUTPATIENT
Start: 2024-10-04

## 2024-10-04 RX ORDER — ALBUTEROL SULFATE 90 UG/1
2 INHALANT RESPIRATORY (INHALATION) 4 TIMES DAILY PRN
Qty: 18 G | Refills: 0 | Status: CANCELLED | OUTPATIENT
Start: 2024-10-04

## 2024-10-04 RX ORDER — ONDANSETRON 4 MG/1
4 TABLET, FILM COATED ORAL EVERY 12 HOURS PRN
Qty: 60 TABLET | Refills: 0 | Status: SHIPPED | OUTPATIENT
Start: 2024-10-04 | End: 2024-11-03

## 2024-10-04 RX ORDER — FAMOTIDINE 20 MG/1
20 TABLET, FILM COATED ORAL 2 TIMES DAILY
Qty: 60 TABLET | Refills: 3 | Status: SHIPPED | OUTPATIENT
Start: 2024-10-04

## 2024-10-04 RX ORDER — POLYETHYLENE GLYCOL 3350 17 G/17G
17 POWDER, FOR SOLUTION ORAL DAILY
Qty: 1530 G | Refills: 1 | Status: SHIPPED | OUTPATIENT
Start: 2024-10-04 | End: 2025-04-02

## 2024-10-04 RX ORDER — KETOROLAC TROMETHAMINE 30 MG/ML
30 INJECTION, SOLUTION INTRAMUSCULAR; INTRAVENOUS ONCE
Status: SHIPPED | OUTPATIENT
Start: 2024-10-04 | End: 2024-10-09

## 2024-10-04 RX ORDER — NAPROXEN 500 MG/1
500 TABLET ORAL 2 TIMES DAILY PRN
Qty: 60 TABLET | Refills: 0 | Status: SHIPPED | OUTPATIENT
Start: 2024-10-04

## 2024-10-04 NOTE — PROGRESS NOTES
Visit Information    Have you changed or started any medications since your last visit including any over-the-counter medicines, vitamins, or herbal medicines? no   Have you stopped taking any of your medications? Is so, why? -  no  Are you having any side effects from any of your medications? - no    Have you seen any other physician or provider since your last visit?  no   Have you had any other diagnostic tests since your last visit?  no   Have you been seen in the emergency room and/or had an admission in a hospital since we last saw you?  no   Have you had your routine dental cleaning in the past 6 months?  no     Do you have an active MyChart account? If no, what is the barrier?  Yes    Patient Care Team:  Nathalie Mayen DO as PCP - General (Family Medicine)    Medical History Review  Past Medical, Family, and Social History reviewed and does not contribute to the patient presenting condition    Health Maintenance   Topic Date Due    Hepatitis B vaccine (1 of 3 - 19+ 3-dose series) Never done    DTaP/Tdap/Td vaccine (1 - Tdap) Never done    Breast cancer screen  Never done    Colorectal Cancer Screen  Never done    Cervical cancer screen  03/20/2020    Shingles vaccine (1 of 2) Never done    Flu vaccine (1) Never done    COVID-19 Vaccine (1 - 2023-24 season) Never done    A1C test (Diabetic or Prediabetic)  08/19/2025    Depression Monitoring  08/19/2025    Lipids  02/27/2028    Hepatitis C screen  Completed    HIV screen  Completed    Hepatitis A vaccine  Aged Out    Hib vaccine  Aged Out    Polio vaccine  Aged Out    Meningococcal (ACWY) vaccine  Aged Out    Pneumococcal 0-64 years Vaccine  Aged Out    Depression Screen  Discontinued

## 2024-10-04 NOTE — PROGRESS NOTES
Attending Physician Statement  I  have discussed the care of Suzi Barnes including pertinent history and exam findings with the resident. I agree with the assessment, plan and orders as documented by the resident.      /89 (Site: Left Upper Arm, Position: Sitting, Cuff Size: Medium Adult)   Pulse 91   Temp 98.3 °F (36.8 °C) (Oral)   Wt 107 kg (236 lb)   BMI 41.82 kg/m²    BP Readings from Last 3 Encounters:   10/04/24 133/89   08/19/24 124/84   07/20/24 (!) 143/94     Wt Readings from Last 3 Encounters:   10/04/24 107 kg (236 lb)   08/19/24 107 kg (236 lb)   07/20/24 97.5 kg (215 lb)          Diagnosis Orders   1. Acute hip pain, left  naproxen (NAPROSYN) 500 MG tablet    XR HIP LEFT (2-3 VIEWS)    LATASHA Screen With Reflex    C-Reactive Protein    Sedimentation Rate    ketorolac (TORADOL) injection 30 mg    ketorolac (TORADOL) injection 30 mg      2. Hypothyroidism, unspecified type  levothyroxine (SYNTHROID) 200 MCG tablet      3. Gastroesophageal reflux disease without esophagitis  famotidine (PEPCID) 20 MG tablet    ondansetron (ZOFRAN) 4 MG tablet      4. Constipation, unspecified constipation type  polyethylene glycol (GLYCOLAX) 17 GM/SCOOP powder              Gerald Charlton DO 10/8/2024 9:06 PM

## 2024-10-04 NOTE — PROGRESS NOTES
Subjective:    Suzi Barnes is a 49 y.o. female with  has a past medical history of Hypothyroid.    Presented to the office today for:  Chief Complaint   Patient presents with    Hip Pain     Patient is having left side hip pain for about 2 week       Hip Pain       Patient presented today complaining of the left hip pain of 1 week duration, patient had her son wedding and danced for long time and then woke up the next night having the pain.  With limited range of motion, pain did improve on ibuprofen.  Denies any history of rheumatological diseases or rheumatoid arthritis, does have fibromyalgia.      Also still complain about left leg swelling that has been going on for a while, previously ordered venous duplex and will need to complete.      Review of Systems   Constitutional:  Negative for chills and fever.   HENT:  Negative for congestion and rhinorrhea.    Respiratory:  Negative for chest tightness and shortness of breath.    Cardiovascular:  Negative for chest pain, palpitations and leg swelling.   Gastrointestinal:  Negative for abdominal pain.   Genitourinary:  Negative for dysuria, flank pain and frequency.   Musculoskeletal:  Positive for arthralgias, joint swelling and myalgias. Negative for back pain.   Neurological:  Negative for weakness and headaches.   Psychiatric/Behavioral:  Negative for agitation and behavioral problems. The patient is not nervous/anxious.          The patient has a No family history on file.    Objective:    /89 (Site: Left Upper Arm, Position: Sitting, Cuff Size: Medium Adult)   Pulse 91   Temp 98.3 °F (36.8 °C) (Oral)   Wt 107 kg (236 lb)   BMI 41.82 kg/m²    BP Readings from Last 3 Encounters:   10/04/24 133/89   08/19/24 124/84   07/20/24 (!) 143/94       Physical Exam  Constitutional:       Appearance: Normal appearance.   Eyes:      Extraocular Movements: Extraocular movements intact.      Pupils: Pupils are equal, round, and reactive to light.

## 2024-10-24 ENCOUNTER — TELEPHONE (OUTPATIENT)
Dept: FAMILY MEDICINE CLINIC | Age: 49
End: 2024-10-24

## 2024-10-24 NOTE — TELEPHONE ENCOUNTER
Writer called patient to schedule a follow up, writer had to LVM for patient to call office back to schedule.

## 2024-10-24 NOTE — TELEPHONE ENCOUNTER
----- Message from Ludin ALVARADO sent at 10/23/2024  2:11 PM EDT -----  Regarding: [facility] Appointment Request  [facility] Appointment Request    Patient needs appointment for [facility] Appointment Type: Existing Condition Follow Up.    Patient Requested Dates(s): as soon as possible  Patient Requested Time: morning   Provider Name: any provider available    Reason for Appointment Request: Established Patient - Available appointments did not meet patient need  --------------------------------------------------------------------------------------------------------------------------    Relationship to Patient: Self     Call Back Information: OK to leave message on voicemail  Preferred Call Back Number: Phone [phone]    The patient wants to see her PCP as soon as possible to check on her spleen because the last physical the patient have, the doctor notice something on her spleen and was advised to be seen by her PCP.

## 2024-11-26 ENCOUNTER — OFFICE VISIT (OUTPATIENT)
Dept: FAMILY MEDICINE CLINIC | Age: 49
End: 2024-11-26
Payer: COMMERCIAL

## 2024-11-26 ENCOUNTER — HOSPITAL ENCOUNTER (OUTPATIENT)
Age: 49
Setting detail: SPECIMEN
Discharge: HOME | End: 2024-11-26

## 2024-11-26 VITALS
BODY MASS INDEX: 44.4 KG/M2 | DIASTOLIC BLOOD PRESSURE: 77 MMHG | HEIGHT: 63 IN | HEART RATE: 84 BPM | SYSTOLIC BLOOD PRESSURE: 119 MMHG | WEIGHT: 250.6 LBS

## 2024-11-26 DIAGNOSIS — B37.2 CANDIDAL SKIN INFECTION: ICD-10-CM

## 2024-11-26 DIAGNOSIS — E66.01 CLASS 3 SEVERE OBESITY DUE TO EXCESS CALORIES WITHOUT SERIOUS COMORBIDITY WITH BODY MASS INDEX (BMI) OF 40.0 TO 44.9 IN ADULT: Primary | ICD-10-CM

## 2024-11-26 DIAGNOSIS — M25.552 ACUTE HIP PAIN, LEFT: ICD-10-CM

## 2024-11-26 DIAGNOSIS — E66.813 CLASS 3 SEVERE OBESITY DUE TO EXCESS CALORIES WITHOUT SERIOUS COMORBIDITY WITH BODY MASS INDEX (BMI) OF 40.0 TO 44.9 IN ADULT: Primary | ICD-10-CM

## 2024-11-26 DIAGNOSIS — H57.04: ICD-10-CM

## 2024-11-26 LAB
CRP SERPL HS-MCNC: 7.5 MG/L (ref 0–5)
ERYTHROCYTE [SEDIMENTATION RATE] IN BLOOD BY PHOTOMETRIC METHOD: 40 MM/HR (ref 0–30)

## 2024-11-26 PROCEDURE — 99213 OFFICE O/P EST LOW 20 MIN: CPT

## 2024-11-26 RX ORDER — NYSTATIN 100000 [USP'U]/G
POWDER TOPICAL
Qty: 60 G | Refills: 0 | Status: SHIPPED | OUTPATIENT
Start: 2024-11-26

## 2024-11-26 RX ORDER — MELOXICAM 15 MG/1
15 TABLET ORAL DAILY
Qty: 30 TABLET | Refills: 0 | Status: SHIPPED
Start: 2024-11-26 | End: 2024-11-26 | Stop reason: CLARIF

## 2024-11-26 ASSESSMENT — PATIENT HEALTH QUESTIONNAIRE - PHQ9
7. TROUBLE CONCENTRATING ON THINGS, SUCH AS READING THE NEWSPAPER OR WATCHING TELEVISION: NOT AT ALL
SUM OF ALL RESPONSES TO PHQ9 QUESTIONS 1 & 2: 4
6. FEELING BAD ABOUT YOURSELF - OR THAT YOU ARE A FAILURE OR HAVE LET YOURSELF OR YOUR FAMILY DOWN: SEVERAL DAYS
10. IF YOU CHECKED OFF ANY PROBLEMS, HOW DIFFICULT HAVE THESE PROBLEMS MADE IT FOR YOU TO DO YOUR WORK, TAKE CARE OF THINGS AT HOME, OR GET ALONG WITH OTHER PEOPLE: EXTREMELY DIFFICULT
SUM OF ALL RESPONSES TO PHQ QUESTIONS 1-9: 6
8. MOVING OR SPEAKING SO SLOWLY THAT OTHER PEOPLE COULD HAVE NOTICED. OR THE OPPOSITE, BEING SO FIGETY OR RESTLESS THAT YOU HAVE BEEN MOVING AROUND A LOT MORE THAN USUAL: NOT AT ALL
5. POOR APPETITE OR OVEREATING: SEVERAL DAYS
SUM OF ALL RESPONSES TO PHQ QUESTIONS 1-9: 6
2. FEELING DOWN, DEPRESSED OR HOPELESS: SEVERAL DAYS
4. FEELING TIRED OR HAVING LITTLE ENERGY: NOT AT ALL
SUM OF ALL RESPONSES TO PHQ QUESTIONS 1-9: 6
SUM OF ALL RESPONSES TO PHQ QUESTIONS 1-9: 6
9. THOUGHTS THAT YOU WOULD BE BETTER OFF DEAD, OR OF HURTING YOURSELF: NOT AT ALL
1. LITTLE INTEREST OR PLEASURE IN DOING THINGS: NEARLY EVERY DAY
3. TROUBLE FALLING OR STAYING ASLEEP: NOT AT ALL

## 2024-11-26 ASSESSMENT — ENCOUNTER SYMPTOMS
NAUSEA: 0
SORE THROAT: 0
SHORTNESS OF BREATH: 0
BACK PAIN: 0
COUGH: 0
DIARRHEA: 0
RHINORRHEA: 0
ABDOMINAL PAIN: 0

## 2024-11-26 NOTE — PATIENT INSTRUCTIONS
Thank you for letting us take care of you today. We hope all your questions were addressed. If a question was overlooked or something else comes to mind after you return home, please contact a member of your Care Team listed below.      Your Care Team at [facility] is Team #1  Martha Sow M.D. (Faculty)  Isauro Rodriguez M.D. (Resident)  John Stark D.O. (Resident)  Tay Holland M.D. (Resident)  Lana Palma M.D. (Resident)  Virgen Hsu, [facility]  Ludin Lawrence, [facility]  Estee Muller, [facility]  Maday Cowan, [facility]  Sheridan Delong, [facility]  Sheree Ramirez, [facility]  Lena Mohamud, [facility]  Rose (LJ) Ramya,   [facility] (Clinical Pharmacist)     Office phone number: [phone]    If you need to get in right away due to illness, please be advised we have \"Same Day\" appointments available Monday-Friday. Please call us at [phone] option #3 to schedule your \"Same Day\" appointment.

## 2024-11-26 NOTE — PROGRESS NOTES
Attending Physician Statement  I have discussed the care of Suzi Barnes, 49 y.o. female,including pertinent history and exam findings,  with the resident Nathalie Garcia DO.  History:  Chief Complaint   Patient presents with    Weight Management       I have reviewed the key elements of the encounter with the resident. Examination was done by resident as documented in residents note.    BP Readings from Last 3 Encounters:   11/26/24 119/77   10/04/24 133/89   08/19/24 124/84     /77 (Site: Right Lower Arm, Position: Sitting, Cuff Size: Medium Adult)   Pulse 84   Ht 1.6 m (5' 2.99\")   Wt 113.7 kg (250 lb 9.6 oz)   BMI 44.40 kg/m²   Lab Results   Component Value Date    CHOL 173 02/27/2023    TRIG 102 02/27/2023    HDL 65 02/27/2023    ALT 13 12/01/2022    AST 20 12/01/2022     12/01/2022    K 4.0 12/01/2022    CL 95 (L) 12/01/2022    CREATININE 0.64 12/01/2022    BUN 14 12/01/2022    CO2 31 12/01/2022    TSH 36.40 (H) 10/04/2024    LABA1C 6.0 08/19/2024     Lab Results   Component Value Date    CALCIUM 9.3 12/01/2022     No results found for: \"LDLDIRECT\"  I agree with the assessment, plan and diagnosis of    Diagnosis Orders   1. Class 3 severe obesity due to excess calories without serious comorbidity with body mass index (BMI) of 40.0 to 44.9 in adult  [facility] - Raj Rubin MD, Ophthalmology, [facility]- [facility]      2. Acute hip pain, left  meloxicam (MOBIC) 15 MG tablet      3. Candidal skin infection  nystatin (MYCOSTATIN) 367089 UNIT/GM powder      4. Dilated pupil due to trauma          I agree with orders as documented by the resident.    Recommendations: Agree with resident assessment and plan.  Patient is not a candidate for NSAIDs due to history of Los-en-Y.    Return in about 6 months (around 5/26/2025) for weight management .   (GE Modifier ) Dr. CHRIS HUGHES MD  
Visit Information    Have you changed or started any medications since your last visit including any over-the-counter medicines, vitamins, or herbal medicines? no   Are you having any side effects from any of your medications? -  no  Have you stopped taking any of your medications? Is so, why? -  no    Have you seen any other physician or provider since your last visit? No  Have you had any other diagnostic tests since your last visit? No  Have you been seen in the emergency room and/or had an admission to a hospital since we last saw you? No  Have you had your routine dental cleaning in the past 6 months? no    Have you activated your [employer] account? If not, what are your barriers? Yes     Patient Care Team:  Nathalie Mayen DO as PCP - General (Family Medicine)    Medical History Review  Past Medical, Family, and Social History reviewed and does not contribute to the patient presenting condition    Health Maintenance   Topic Date Due    Hepatitis B vaccine (1 of 3 - 19+ 3-dose series) Never done    DTaP/Tdap/Td vaccine (1 - Tdap) Never done    Breast cancer screen  Never done    Colorectal Cancer Screen  Never done    Cervical cancer screen  03/20/2020    Shingles vaccine (1 of 2) Never done    COVID-19 Vaccine (1 - 2023-24 season) Never done    A1C test (Diabetic or Prediabetic)  08/19/2025    Depression Monitoring  08/19/2025    Lipids  02/27/2028    Flu vaccine  Completed    Hepatitis C screen  Completed    HIV screen  Completed    Hepatitis A vaccine  Aged Out    Hib vaccine  Aged Out    Polio vaccine  Aged Out    Meningococcal (ACWY) vaccine  Aged Out    Pneumococcal 0-64 years Vaccine  Aged Out    Depression Screen  Discontinued       
9.6 oz)   BMI 44.40 kg/m²    BP Readings from Last 3 Encounters:   11/26/24 119/77   10/04/24 133/89   08/19/24 124/84       Physical Exam  Vitals reviewed.   Constitutional:       General: She is not in acute distress.     Appearance: Normal appearance. She is obese.   Eyes:      Comments: Left pupil dilated   Cardiovascular:      Rate and Rhythm: Normal rate and regular rhythm.      Pulses: Normal pulses.      Heart sounds: Normal heart sounds. No murmur heard.  Pulmonary:      Effort: Pulmonary effort is normal.      Breath sounds: Normal breath sounds.   Abdominal:      General: Bowel sounds are normal.      Palpations: Abdomen is soft.      Tenderness: There is no abdominal tenderness.   Musculoskeletal:      Cervical back: Normal range of motion.      Right lower leg: No edema.      Left lower leg: No edema.   Neurological:      Mental Status: She is alert.   Psychiatric:         Mood and Affect: Mood normal.         Lab Results   Component Value Date    CHOL 173 02/27/2023    TRIG 102 02/27/2023    HDL 65 02/27/2023    ALT 13 12/01/2022    AST 20 12/01/2022     12/01/2022    K 4.0 12/01/2022    CL 95 (L) 12/01/2022    CREATININE 0.64 12/01/2022    BUN 14 12/01/2022    CO2 31 12/01/2022    TSH 36.40 (H) 10/04/2024    LABA1C 6.0 08/19/2024     Lab Results   Component Value Date    CALCIUM 9.3 12/01/2022     No results found for: \"LDLDIRECT\"    Assessment and Plan:    1. Class 3 severe obesity due to excess calories without serious comorbidity with body mass index (BMI) of 40.0 to 44.9 in Critical access hospital  - [facility], [place]  - [facility] - Raj Rubin MD, Ophthalmology, [facility] Services- [facility]    3. Candidal skin infection  - nystatin (MYCOSTATIN) 636660 UNIT/GM powder; Apply topically 4 times daily.  Dispense: 60 g; Refill: 0    4. Dilated pupil due to trauma  - MRI BRAIN W WO CONTRAST; Future          Requested Prescriptions     Signed Prescriptions Disp

## 2024-11-27 LAB
ANA SER QL IA: NEGATIVE
DSDNA IGG SER QL IA: 2.1 IU/ML
NUCLEAR IGG SER IA-RTO: 0.2 U/ML

## 2024-12-05 ENCOUNTER — TELEPHONE (OUTPATIENT)
Dept: FAMILY MEDICINE CLINIC | Age: 49
End: 2024-12-05

## 2024-12-05 NOTE — TELEPHONE ENCOUNTER
----- Message from Dr. Isauro Rodriguez MD sent at 11/30/2024  3:52 PM EST -----  Patient probably needs earlier appointment with her PCP to review results.   Thanks   Isauro

## 2024-12-11 ENCOUNTER — TELEPHONE (OUTPATIENT)
Dept: SURGERY | Age: 49
End: 2024-12-11

## 2024-12-11 NOTE — TELEPHONE ENCOUNTER
----- Message from Rodríguez HERNANDEZ sent at 12/5/2024 11:01 AM EST -----  Regarding: ECC Referral Request  ECC Referral Request    Reason for referral request: Lab/Test Order    Specialist/Lab/Test patient is requesting (if known): MRI     Specialist Phone Number (if applicable):    Additional Information patient received a call from the office to schedule her MRI and she doesn't know what the next thing to do and doesn't have an order.  --------------------------------------------------------------------------------------------------------------------------    Relationship to Patient: Self     Call Back Information: OK to leave message on voicemail  Preferred Call Back Number: Phone [phone]

## 2024-12-12 ENCOUNTER — TELEPHONE (OUTPATIENT)
Dept: BARIATRICS/WEIGHT MGMT | Age: 49
End: 2024-12-12

## 2024-12-12 NOTE — TELEPHONE ENCOUNTER
Left voice message for patient that Dr. Garcia agreed to take patient on as a transfer of care and that there is a $200.00 fee.  Previous RYGB 2011/2014

## 2024-12-20 DIAGNOSIS — M25.552 ACUTE HIP PAIN, LEFT: ICD-10-CM

## 2024-12-23 RX ORDER — MELOXICAM 15 MG/1
15 TABLET ORAL DAILY
Qty: 30 TABLET | Refills: 2 | OUTPATIENT
Start: 2024-12-23

## 2024-12-23 NOTE — TELEPHONE ENCOUNTER
Last visit: 11/26/24  Last Med refill:   Does patient have enough medication for 72 hours: no    Next Visit Date:5/27/25  Future Appointments   Date Time Provider Department Center   5/27/2025  1:45 PM Nathalie Mayen DO [facility] DEP       Health Maintenance   Topic Date Due    Hepatitis B vaccine (1 of 3 - 19+ 3-dose series) Never done    DTaP/Tdap/Td vaccine (1 - Tdap) Never done    Breast cancer screen  Never done    Colorectal Cancer Screen  Never done    Cervical cancer screen  03/20/2020    Shingles vaccine (1 of 2) Never done    COVID-19 Vaccine (1 - 2023-24 season) Never done    A1C test (Diabetic or Prediabetic)  08/19/2025    Depression Screen  11/26/2025    Lipids  02/27/2028    Flu vaccine  Completed    Hepatitis C screen  Completed    HIV screen  Completed    Hepatitis A vaccine  Aged Out    Hib vaccine  Aged Out    Polio vaccine  Aged Out    Meningococcal (ACWY) vaccine  Aged Out    Pneumococcal 0-64 years Vaccine  Aged Out    Depression Monitoring  Discontinued       Hemoglobin A1C (%)   Date Value   08/19/2024 6.0   10/02/2023 5.8   11/21/2022 5.7             ( goal A1C is < 7)   No components found for: \"LABMICR\"  No components found for: \"LDLCHOLESTEROL\", \"LDLCALC\"    (goal LDL is <100)   AST (U/L)   Date Value   12/01/2022 20     ALT (U/L)   Date Value   12/01/2022 13     BUN (mg/dL)   Date Value   12/01/2022 14     BP Readings from Last 3 Encounters:   11/26/24 119/77   10/04/24 133/89   08/19/24 124/84          (goal 120/80)    All Future Testing planned in [facility]  Lab Frequency Next Occurrence   Vascular duplex lower extremity venous right Once 08/19/2024   JOY DIGITAL SCREEN W OR WO CAD BILATERAL Once 08/19/2024   XR HIP LEFT (2-3 VIEWS) Once 10/04/2024   MRI BRAIN W WO CONTRAST Once 11/26/2024               Patient Active Problem List:     Bilateral swelling of feet     Hypothyroidism     Bilateral lower extremity edema     Constipation     Gastroesophageal reflux disease

## 2024-12-29 ENCOUNTER — APPOINTMENT (OUTPATIENT)
Dept: CT IMAGING | Age: 49
End: 2024-12-29
Payer: COMMERCIAL

## 2024-12-29 ENCOUNTER — HOSPITAL ENCOUNTER (EMERGENCY)
Age: 49
Discharge: HOME | End: 2024-12-29
Attending: EMERGENCY MEDICINE
Payer: COMMERCIAL

## 2024-12-29 VITALS
BODY MASS INDEX: 46.01 KG/M2 | DIASTOLIC BLOOD PRESSURE: 80 MMHG | RESPIRATION RATE: 15 BRPM | SYSTOLIC BLOOD PRESSURE: 120 MMHG | TEMPERATURE: 98.2 F | HEART RATE: 79 BPM | HEIGHT: 62 IN | OXYGEN SATURATION: 94 % | WEIGHT: 250 LBS

## 2024-12-29 DIAGNOSIS — R42 DIZZINESS: Primary | ICD-10-CM

## 2024-12-29 DIAGNOSIS — H81.13 BENIGN PAROXYSMAL POSITIONAL VERTIGO DUE TO BILATERAL VESTIBULAR DISORDER: ICD-10-CM

## 2024-12-29 LAB
ANION GAP SERPL CALCULATED.3IONS-SCNC: 14 MMOL/L (ref 9–16)
BASOPHILS # BLD: 0.1 K/UL (ref 0–0.2)
BASOPHILS NFR BLD: 1 % (ref 0–2)
BUN SERPL-MCNC: 12 MG/DL (ref 6–20)
CALCIUM SERPL-MCNC: 8.9 MG/DL (ref 8.6–10.4)
CHLORIDE SERPL-SCNC: 98 MMOL/L (ref 98–107)
CO2 SERPL-SCNC: 26 MMOL/L (ref 20–31)
CREAT SERPL-MCNC: 0.7 MG/DL (ref 0.7–1.2)
EOSINOPHIL # BLD: 0.2 K/UL (ref 0–0.4)
EOSINOPHILS RELATIVE PERCENT: 2 % (ref 0–4)
ERYTHROCYTE [DISTWIDTH] IN BLOOD BY AUTOMATED COUNT: 15.6 % (ref 11.5–14.9)
GFR, ESTIMATED: >90 ML/MIN/1.73M2
GLUCOSE SERPL-MCNC: 188 MG/DL (ref 74–99)
HCG SERPL QL: NEGATIVE
HCT VFR BLD AUTO: 38.2 % (ref 36–46)
HGB BLD-MCNC: 12.6 G/DL (ref 12–16)
LYMPHOCYTES NFR BLD: 2.9 K/UL (ref 1–4.8)
LYMPHOCYTES RELATIVE PERCENT: 36 % (ref 24–44)
MCH RBC QN AUTO: 29.5 PG (ref 26–34)
MCHC RBC AUTO-ENTMCNC: 32.9 G/DL (ref 31–37)
MCV RBC AUTO: 89.6 FL (ref 80–100)
MONOCYTES NFR BLD: 0.4 K/UL (ref 0.1–1.3)
MONOCYTES NFR BLD: 6 % (ref 1–7)
NEUTROPHILS NFR BLD: 55 % (ref 36–66)
NEUTS SEG NFR BLD: 4.5 K/UL (ref 1.3–9.1)
PLATELET # BLD AUTO: 329 K/UL (ref 150–450)
PMV BLD AUTO: 8.1 FL (ref 6–12)
POTASSIUM SERPL-SCNC: 3 MMOL/L (ref 3.7–5.3)
RBC # BLD AUTO: 4.26 M/UL (ref 4–5.2)
SODIUM SERPL-SCNC: 138 MMOL/L (ref 136–145)
TROPONIN I SERPL HS-MCNC: 8 NG/L (ref 0–14)
TROPONIN I SERPL HS-MCNC: <6 NG/L (ref 0–14)
TSH SERPL DL<=0.05 MIU/L-ACNC: 2.35 UIU/ML (ref 0.27–4.2)
WBC OTHER # BLD: 8 K/UL (ref 3.5–11)

## 2024-12-29 PROCEDURE — 96374 THER/PROPH/DIAG INJ IV PUSH: CPT

## 2024-12-29 PROCEDURE — 80048 BASIC METABOLIC PNL TOTAL CA: CPT

## 2024-12-29 PROCEDURE — 84484 ASSAY OF TROPONIN QUANT: CPT

## 2024-12-29 PROCEDURE — 6370000000 HC RX 637 (ALT 250 FOR IP)

## 2024-12-29 PROCEDURE — 6360000004 HC RX CONTRAST MEDICATION

## 2024-12-29 PROCEDURE — 2580000003 HC RX 258

## 2024-12-29 PROCEDURE — 2500000003 HC RX 250 WO HCPCS

## 2024-12-29 PROCEDURE — 70498 CT ANGIOGRAPHY NECK: CPT

## 2024-12-29 PROCEDURE — 93005 ELECTROCARDIOGRAM TRACING: CPT

## 2024-12-29 PROCEDURE — 6360000002 HC RX W HCPCS

## 2024-12-29 PROCEDURE — 99285 EMERGENCY DEPT VISIT HI MDM: CPT

## 2024-12-29 PROCEDURE — 84443 ASSAY THYROID STIM HORMONE: CPT

## 2024-12-29 PROCEDURE — 70450 CT HEAD/BRAIN W/O DYE: CPT

## 2024-12-29 PROCEDURE — 85025 COMPLETE CBC W/AUTO DIFF WBC: CPT

## 2024-12-29 PROCEDURE — 84703 CHORIONIC GONADOTROPIN ASSAY: CPT

## 2024-12-29 PROCEDURE — 36415 COLL VENOUS BLD VENIPUNCTURE: CPT

## 2024-12-29 RX ORDER — MECLIZINE HYDROCHLORIDE 25 MG/1
25 TABLET ORAL 3 TIMES DAILY PRN
Qty: 30 TABLET | Refills: 0 | Status: SHIPPED | OUTPATIENT
Start: 2024-12-29 | End: 2024-12-29

## 2024-12-29 RX ORDER — ONDANSETRON 2 MG/ML
4 INJECTION INTRAMUSCULAR; INTRAVENOUS ONCE
Status: COMPLETED | OUTPATIENT
Start: 2024-12-29 | End: 2024-12-29

## 2024-12-29 RX ORDER — MECLIZINE HYDROCHLORIDE 25 MG/1
25 TABLET ORAL ONCE
Status: COMPLETED | OUTPATIENT
Start: 2024-12-29 | End: 2024-12-29

## 2024-12-29 RX ORDER — IOPAMIDOL 755 MG/ML
75 INJECTION, SOLUTION INTRAVASCULAR
Status: COMPLETED | OUTPATIENT
Start: 2024-12-29 | End: 2024-12-29

## 2024-12-29 RX ORDER — SODIUM CHLORIDE 0.9 % (FLUSH) 0.9 %
10 SYRINGE (ML) INJECTION PRN
Status: DISCONTINUED | OUTPATIENT
Start: 2024-12-29 | End: 2024-12-29 | Stop reason: HOSPADM

## 2024-12-29 RX ORDER — 0.9 % SODIUM CHLORIDE 0.9 %
100 INTRAVENOUS SOLUTION INTRAVENOUS ONCE
Status: COMPLETED | OUTPATIENT
Start: 2024-12-29 | End: 2024-12-29

## 2024-12-29 RX ORDER — MECLIZINE HYDROCHLORIDE 25 MG/1
25 TABLET ORAL 3 TIMES DAILY PRN
Qty: 30 TABLET | Refills: 0 | Status: SHIPPED | OUTPATIENT
Start: 2024-12-29 | End: 2025-01-08

## 2024-12-29 RX ADMIN — POTASSIUM BICARBONATE 40 MEQ: 782 TABLET, EFFERVESCENT ORAL at 20:13

## 2024-12-29 RX ADMIN — SODIUM CHLORIDE 100 ML: 9 INJECTION, SOLUTION INTRAVENOUS at 20:10

## 2024-12-29 RX ADMIN — MECLIZINE HYDROCHLORIDE 25 MG: 25 TABLET ORAL at 19:01

## 2024-12-29 RX ADMIN — SODIUM CHLORIDE, PRESERVATIVE FREE 10 ML: 5 INJECTION INTRAVENOUS at 20:10

## 2024-12-29 RX ADMIN — IOPAMIDOL 75 ML: 755 INJECTION, SOLUTION INTRAVENOUS at 20:10

## 2024-12-29 RX ADMIN — ONDANSETRON 4 MG: 2 INJECTION, SOLUTION INTRAMUSCULAR; INTRAVENOUS at 19:01

## 2024-12-29 ASSESSMENT — LIFESTYLE VARIABLES
HOW MANY STANDARD DRINKS CONTAINING ALCOHOL DO YOU HAVE ON A TYPICAL DAY: PATIENT DOES NOT DRINK
HOW OFTEN DO YOU HAVE A DRINK CONTAINING ALCOHOL: NEVER

## 2024-12-29 ASSESSMENT — PAIN - FUNCTIONAL ASSESSMENT: PAIN_FUNCTIONAL_ASSESSMENT: NONE - DENIES PAIN

## 2024-12-29 NOTE — ED PROVIDER NOTES
[facility] EMERGENCY DEPARTMENT  Emergency Department Encounter  Emergency Medicine Resident     Pt Name:Suzi Barnes  MRN: [id number]  Birthdate 1975  Date of evaluation: 12/29/24  PCP:  Nathalie Mayen DO  Note Started: 6:49 PM EST      CHIEF COMPLAINT       Chief Complaint   Patient presents with    Dizziness    Nausea    Vomiting     Dizziness and nausea started 2 hours ago     Headache       HISTORY OF PRESENT ILLNESS  (Location/Symptom, Timing/Onset, Context/Setting, Quality, Duration, Modifying Factors, Severity.)      Suzi Barnes is a 49 y.o. female with no significant past medical history who presents with abrupt onset dizziness, lightheadedness, nausea that began approximately 2 hours ago soon after she stood up from the couch.  Patient denies any associated headache, vision change.  No photophobia or phonophobia.  She states that she was feeling well prior to this.  Patient reports that she has had vertigo in the past and this feels somewhat similar though more severe.  She denies abdominal pain or episodes of vomiting.  No abnormal bowel movements.  No urinary symptoms.  Did not take any medication prior to coming to the emergency department.    PAST MEDICAL / SURGICAL / SOCIAL / FAMILY HISTORY      has a past medical history of Hypothyroid.       has no past surgical history on file.      Social History     Socioeconomic History    Marital status: Single     Spouse name: Not on file    Number of children: Not on file    Years of education: Not on file    Highest education level: Not on file   Occupational History    Not on file   Tobacco Use    Smoking status: Never    Smokeless tobacco: Never   Substance and Sexual Activity    Alcohol use: No    Drug use: No    Sexual activity: Not on file   Other Topics Concern    Not on file   Social History Narrative    Not on file     Social Determinants of Health     Financial Resource Strain: Low Risk  (8/19/2024)    Overall Financial

## 2024-12-30 NOTE — DISCHARGE INSTRUCTIONS
The CT imaging of your head was normal.  Take the meclizine as needed for dizziness.  This is likely vertigo.    Take the medication as indicated.  Get up slowly; dangle your feet over the bed before standing up, do not stand up quickly.     Sit upright.  Turn your head to the symptomatic side at a 45 degree angle, and lie on your back  Remain up to 5 minutes in this position.  Turn your head 90 degrees to the other side  Remain up to 5 minutes in this position.  Roll your body onto your side in the direction you are facing; now you are pointing your head nose down.  Remain up to 5 minutes in this position.  Go back to the sitting position and remain up to 30 seconds in this position.    The entire procedure should be repeated two more times, for a total of three times.    Return to the Emergency Department for any other care or concern.    PLEASE RETURN TO THE EMERGENCY DEPARTMENT IMMEDIATELY for worsening symptoms, worsening of sensation of room spinning, any headache, slurring of speech, change in vision / hearing / taste, ringing in your ears, loss of sensation or difficulty moving your arms or legs, excessive nausea or vomiting, or if you develop any concerning symptoms such as: high fever not relieved by acetaminophen (Tylenol) and/or ibuprofen (Motrin / Advil), chills, shortness of breath, chest pain, feeling of your heart fluttering or racing, persistent nausea and/or vomiting, vomiting up blood, blood in your stool, loss of consciousness, numbness, weakness or tingling in the arms or legs or change in color of the extremities, changes in mental status, persistent headache, blurry vision, loss of bladder / bowel control, unable to follow up with your physician, or other any other care or concern.

## 2024-12-31 LAB
EKG ATRIAL RATE: 100 BPM
EKG P-R INTERVAL: 128 MS
EKG Q-T INTERVAL: 406 MS
EKG QRS DURATION: 156 MS
EKG QTC CALCULATION (BAZETT): 523 MS
EKG R AXIS: 21 DEGREES
EKG T AXIS: -73 DEGREES
EKG VENTRICULAR RATE: 100 BPM

## 2024-12-31 PROCEDURE — 93010 ELECTROCARDIOGRAM REPORT: CPT | Performed by: INTERNAL MEDICINE

## 2025-01-04 DIAGNOSIS — K21.9 GASTROESOPHAGEAL REFLUX DISEASE WITHOUT ESOPHAGITIS: ICD-10-CM

## 2025-01-06 RX ORDER — ONDANSETRON 4 MG/1
TABLET, ORALLY DISINTEGRATING ORAL
Qty: 60 TABLET | Refills: 2 | Status: SHIPPED | OUTPATIENT
Start: 2025-01-06

## 2025-01-06 NOTE — TELEPHONE ENCOUNTER
Last visit: 11/26/2024  Last Med refill: 10/04/2024  Does patient have enough medication for 72 hours: No:     Next Visit Date:  Future Appointments   Date Time Provider Department Center   1/6/2025 10:30 AM [facility] DO [facility] DEP   5/27/2025  1:45 PM Nathalie Mayen DO [facility] DEP       Health Maintenance   Topic Date Due    Hepatitis B vaccine (1 of 3 - 19+ 3-dose series) Never done    DTaP/Tdap/Td vaccine (1 - Tdap) Never done    Breast cancer screen  Never done    Colorectal Cancer Screen  Never done    Cervical cancer screen  03/20/2020    Shingles vaccine (1 of 2) Never done    COVID-19 Vaccine (1 - 2023-24 season) Never done    A1C test (Diabetic or Prediabetic)  08/19/2025    Depression Screen  11/26/2025    Lipids  02/27/2028    Flu vaccine  Completed    Hepatitis C screen  Completed    HIV screen  Completed    Hepatitis A vaccine  Aged Out    Hib vaccine  Aged Out    Polio vaccine  Aged Out    Meningococcal (ACWY) vaccine  Aged Out    Pneumococcal 0-64 years Vaccine  Aged Out    Depression Monitoring  Discontinued       Hemoglobin A1C (%)   Date Value   08/19/2024 6.0   10/02/2023 5.8   11/21/2022 5.7             ( goal A1C is < 7)   No components found for: \"LABMICR\"  No components found for: \"LDLCHOLESTEROL\", \"LDLCALC\"    (goal LDL is <100)   AST (U/L)   Date Value   12/01/2022 20     ALT (U/L)   Date Value   12/01/2022 13     BUN (mg/dL)   Date Value   12/29/2024 12     BP Readings from Last 3 Encounters:   12/29/24 120/80   11/26/24 119/77   10/04/24 133/89          (goal 120/80)    All Future Testing planned in [facility]  Lab Frequency Next Occurrence   Vascular duplex lower extremity venous right Once 08/19/2024   JOY DIGITAL SCREEN W OR WO CAD BILATERAL Once 08/19/2024   XR HIP LEFT (2-3 VIEWS) Once 10/04/2024   MRI BRAIN W WO CONTRAST Once 11/26/2024               Patient Active Problem List:     Bilateral swelling of feet     Hypothyroidism     Bilateral lower

## 2025-01-09 ENCOUNTER — TELEPHONE (OUTPATIENT)
Dept: FAMILY MEDICINE CLINIC | Age: 50
End: 2025-01-09

## 2025-01-09 NOTE — TELEPHONE ENCOUNTER
Writer called the patient to inform her that the mri that Dr. Mayen order for patient was 11-26-24 for mri of the brain

## 2025-01-17 ENCOUNTER — TELEPHONE (OUTPATIENT)
Dept: FAMILY MEDICINE CLINIC | Age: 50
End: 2025-01-17

## 2025-01-17 DIAGNOSIS — R42 DIZZINESS: Primary | ICD-10-CM

## 2025-01-17 RX ORDER — GLUCOSAMINE HCL/CHONDROITIN SU 500-400 MG
CAPSULE ORAL
Qty: 100 STRIP | Refills: 0 | Status: SHIPPED | OUTPATIENT
Start: 2025-01-17

## 2025-01-17 NOTE — TELEPHONE ENCOUNTER
Patient had called office to see if she had an upcoming appt with Dr. Faheem mullenr informed yes on 5-. Patient also wanted to get her MRI scheduled, sebasr provided the contact number [phone], patient will call to schedule.

## 2025-01-17 NOTE — TELEPHONE ENCOUNTER
Patient is requesting for you to order her some test strips to check her blood sugar because the patient states she is not feeling well. Patient states she has one touch monitor.

## 2025-01-27 ENCOUNTER — TELEPHONE (OUTPATIENT)
Dept: FAMILY MEDICINE CLINIC | Age: 50
End: 2025-01-27

## 2025-01-27 NOTE — TELEPHONE ENCOUNTER
----- Message from Na GILLETTE sent at 1/24/2025  9:27 AM EST -----  Regarding: ECC Message to Provider  ECC Message to Provider    Relationship to Patient: Self     Additional Information : Patient wants to verify if she needs to be seen by her PCP after her MRI on 1/27/2025.   --------------------------------------------------------------------------------------------------------------------------    Call Back Information: OK to leave message on voicemail  Preferred Call Back Number: Phone [phone]

## 2025-01-28 NOTE — TELEPHONE ENCOUNTER
Writer left voicemail to see if patient was able to get test strips. Writer apologized for the delay in response as writer was out of office last week due to being ill.

## 2025-02-03 ENCOUNTER — TELEPHONE (OUTPATIENT)
Dept: FAMILY MEDICINE CLINIC | Age: 50
End: 2025-02-03

## 2025-02-10 DIAGNOSIS — B37.2 CANDIDAL SKIN INFECTION: ICD-10-CM

## 2025-02-10 RX ORDER — NYSTATIN 100000 [USP'U]/G
POWDER TOPICAL
Qty: 60 G | Refills: 2 | Status: SHIPPED | OUTPATIENT
Start: 2025-02-10

## 2025-02-10 NOTE — TELEPHONE ENCOUNTER
Last visit: 11/26/24  Last Med refill:   Does patient have enough medication for 72 hours: No:     Next Visit Date:  Future Appointments   Date Time Provider Department Center   5/27/2025  1:45 PM Nathalie Mayen DO [facility] DEP       Health Maintenance   Topic Date Due    Hepatitis B vaccine (1 of 3 - 19+ 3-dose series) Never done    DTaP/Tdap/Td vaccine (1 - Tdap) Never done    Breast cancer screen  Never done    Colorectal Cancer Screen  Never done    Cervical cancer screen  03/20/2020    Shingles vaccine (1 of 2) Never done    Pneumococcal 50+ years Vaccine (1 of 1 - PCV) Never done    COVID-19 Vaccine (1 - 2024-25 season) Never done    A1C test (Diabetic or Prediabetic)  08/19/2025    Depression Screen  11/26/2025    Lipids  02/27/2028    Flu vaccine  Completed    Hepatitis C screen  Completed    HIV screen  Completed    Hepatitis A vaccine  Aged Out    Hib vaccine  Aged Out    Polio vaccine  Aged Out    Meningococcal (ACWY) vaccine  Aged Out    Depression Monitoring  Discontinued       Hemoglobin A1C (%)   Date Value   08/19/2024 6.0   10/02/2023 5.8   11/21/2022 5.7             ( goal A1C is < 7)   No components found for: \"LABMICR\"  No components found for: \"LDLCHOLESTEROL\", \"LDLCALC\"    (goal LDL is <100)   AST (U/L)   Date Value   12/01/2022 20     ALT (U/L)   Date Value   12/01/2022 13     BUN (mg/dL)   Date Value   12/29/2024 12     BP Readings from Last 3 Encounters:   12/29/24 120/80   11/26/24 119/77   10/04/24 133/89          (goal 120/80)    All Future Testing planned in [facility]  Lab Frequency Next Occurrence   Vascular duplex lower extremity venous right Once 08/19/2024   JOY DIGITAL SCREEN W OR WO CAD BILATERAL Once 08/19/2024   XR HIP LEFT (2-3 VIEWS) Once 10/04/2024   MRI BRAIN W WO CONTRAST Once 11/26/2024               Patient Active Problem List:     Bilateral swelling of feet     Hypothyroidism     Bilateral lower extremity edema     Constipation     Gastroesophageal reflux

## 2025-02-20 NOTE — TELEPHONE ENCOUNTER
Writer left voicemail to inform patient that test strips was sent to pharmacy. 3rd attempt to reach patient.

## 2025-03-12 DIAGNOSIS — K21.9 GASTROESOPHAGEAL REFLUX DISEASE WITHOUT ESOPHAGITIS: ICD-10-CM

## 2025-03-12 NOTE — TELEPHONE ENCOUNTER
Last visit: 11/26/2024  Last Med refill: 10/04/2024  Does patient have enough medication for 72 hours: No:     Next Visit Date:  Future Appointments   Date Time Provider Department Center   5/27/2025  1:45 PM N[facility]alie Mayen DO [facility] DEP       Health Maintenance   Topic Date Due    Hepatitis B vaccine (1 of 3 - 19+ 3-dose series) Never done    DTaP/Tdap/Td vaccine (1 - Tdap) Never done    Breast cancer screen  Never done    Colorectal Cancer Screen  Never done    Cervical cancer screen  03/20/2020    Shingles vaccine (1 of 2) Never done    Pneumococcal 50+ years Vaccine (1 of 1 - PCV) Never done    COVID-19 Vaccine (1 - 2024-25 season) Never done    A1C test (Diabetic or Prediabetic)  08/19/2025    Depression Screen  11/26/2025    Lipids  02/27/2028    Flu vaccine  Completed    Hepatitis C screen  Completed    HIV screen  Completed    Hepatitis A vaccine  Aged Out    Hib vaccine  Aged Out    Polio vaccine  Aged Out    Meningococcal (ACWY) vaccine  Aged Out    Meningococcal B vaccine  Aged Out    Depression Monitoring  Discontinued       Hemoglobin A1C (%)   Date Value   08/19/2024 6.0   10/02/2023 5.8   11/21/2022 5.7             ( goal A1C is < 7)   No components found for: \"LABMICR\"  No components found for: \"LDLCHOLESTEROL\", \"LDLCALC\"    (goal LDL is <100)   AST (U/L)   Date Value   12/01/2022 20     ALT (U/L)   Date Value   12/01/2022 13     BUN (mg/dL)   Date Value   12/29/2024 12     BP Readings from Last 3 Encounters:   12/29/24 120/80   11/26/24 119/77   10/04/24 133/89          (goal 120/80)    All Future Testing planned in [facility]  Lab Frequency Next Occurrence   Vascular duplex lower extremity venous right Once 08/19/2024   JOY DIGITAL SCREEN W OR WO CAD BILATERAL Once 08/19/2024   XR HIP LEFT (2-3 VIEWS) Once 10/04/2024   MRI BRAIN W WO CONTRAST Once 11/26/2024               Patient Active Problem List:     Bilateral swelling of feet     Hypothyroidism     Bilateral lower extremity

## 2025-03-13 RX ORDER — FAMOTIDINE 20 MG/1
20 TABLET, FILM COATED ORAL 2 TIMES DAILY
Qty: 60 TABLET | Refills: 2 | Status: SHIPPED | OUTPATIENT
Start: 2025-03-13

## 2025-03-14 NOTE — TELEPHONE ENCOUNTER
Writer attempted to reach patient to update that test strips was sent to pharmacy. This was back in January. Writer closed encounter.

## 2025-03-24 DIAGNOSIS — E03.9 HYPOTHYROIDISM, UNSPECIFIED TYPE: ICD-10-CM

## 2025-03-24 NOTE — TELEPHONE ENCOUNTER
edema     Constipation     Gastroesophageal reflux disease without esophagitis     Acute hip pain, left

## 2025-03-26 RX ORDER — LEVOTHYROXINE SODIUM 200 UG/1
200 TABLET ORAL DAILY
Qty: 90 TABLET | Refills: 0 | Status: SHIPPED | OUTPATIENT
Start: 2025-03-26

## 2025-03-27 RX ORDER — ONDANSETRON 4 MG/1
TABLET, ORALLY DISINTEGRATING ORAL
Qty: 60 TABLET | Refills: 2 | OUTPATIENT
Start: 2025-03-27

## 2025-03-27 RX ORDER — SCOPOLAMINE 1 MG/3D
1 PATCH, EXTENDED RELEASE TRANSDERMAL
Qty: 10 PATCH | Refills: 0 | Status: SHIPPED | OUTPATIENT
Start: 2025-03-27

## 2025-03-27 NOTE — TELEPHONE ENCOUNTER
Last visit: 11/26/2024  Last Med refill: 01/06/2025  Does patient have enough medication for 72 hours: No:     Next Visit Date:  Future Appointments   Date Time Provider Department Center   4/5/2025  3:15 PM [facility] MRI  [facility] Radiolog   5/27/2025  1:45 PM Nathalie Mayen DO [facility] DEP       Health Maintenance   Topic Date Due    Hepatitis B vaccine (1 of 3 - 19+ 3-dose series) Never done    DTaP/Tdap/Td vaccine (1 - Tdap) Never done    Breast cancer screen  Never done    Colorectal Cancer Screen  Never done    Cervical cancer screen  03/20/2020    Shingles vaccine (1 of 2) Never done    Pneumococcal 50+ years Vaccine (1 of 1 - PCV) Never done    COVID-19 Vaccine (1 - 2024-25 season) Never done    A1C test (Diabetic or Prediabetic)  08/19/2025    Depression Screen  11/26/2025    Lipids  02/27/2028    Flu vaccine  Completed    Hepatitis C screen  Completed    HIV screen  Completed    Hepatitis A vaccine  Aged Out    Hib vaccine  Aged Out    Polio vaccine  Aged Out    Meningococcal (ACWY) vaccine  Aged Out    Meningococcal B vaccine  Aged Out    Depression Monitoring  Discontinued       Hemoglobin A1C (%)   Date Value   08/19/2024 6.0   10/02/2023 5.8   11/21/2022 5.7             ( goal A1C is < 7)   No components found for: \"LABMICR\"  No components found for: \"LDLCHOLESTEROL\", \"LDLCALC\"    (goal LDL is <100)   AST (U/L)   Date Value   12/01/2022 20     ALT (U/L)   Date Value   12/01/2022 13     BUN (mg/dL)   Date Value   12/29/2024 12     BP Readings from Last 3 Encounters:   12/29/24 120/80   11/26/24 119/77   10/04/24 133/89          (goal 120/80)    All Future Testing planned in [facility]  Lab Frequency Next Occurrence   Vascular duplex lower extremity venous right Once 08/19/2024   JOY DIGITAL SCREEN W OR WO CAD BILATERAL Once 08/19/2024   XR HIP LEFT (2-3 VIEWS) Once 10/04/2024   MRI BRAIN W WO CONTRAST Once 11/26/2024               Patient Active Problem List:     Bilateral swelling of

## 2025-03-28 NOTE — TELEPHONE ENCOUNTER
LVM informing patient that medication was sent to pharmacy and if that medication did not help to contact our office to schedule an appointment to discuss medication adjustments.

## 2025-04-04 ENCOUNTER — TELEPHONE (OUTPATIENT)
Dept: FAMILY MEDICINE CLINIC | Age: 50
End: 2025-04-04

## 2025-04-04 NOTE — TELEPHONE ENCOUNTER
Meredith from [facility] contacting office because patient has an MRI scheduled on 4/5/25 for a blown pupil and they would like to know if the PCP would like the patients orbit, face, and neck scanned as well to receive a full view of the patient eye. If so, please update MRI. Please advise.

## 2025-04-23 ENCOUNTER — HOSPITAL ENCOUNTER (OUTPATIENT)
Dept: MRI IMAGING | Age: 50
Discharge: HOME | End: 2025-04-25
Payer: COMMERCIAL

## 2025-04-23 DIAGNOSIS — H57.04: ICD-10-CM

## 2025-04-23 PROCEDURE — 70553 MRI BRAIN STEM W/O & W/DYE: CPT

## 2025-04-23 PROCEDURE — 2500000003 HC RX 250 WO HCPCS

## 2025-04-23 PROCEDURE — 6360000004 HC RX CONTRAST MEDICATION

## 2025-04-23 PROCEDURE — A9579 GAD-BASE MR CONTRAST NOS,1ML: HCPCS

## 2025-04-23 RX ORDER — SODIUM CHLORIDE 0.9 % (FLUSH) 0.9 %
10 SYRINGE (ML) INJECTION PRN
Status: DISCONTINUED | OUTPATIENT
Start: 2025-04-23 | End: 2025-04-26 | Stop reason: HOSPADM

## 2025-04-23 RX ADMIN — GADOTERIDOL 20 ML: 279.3 INJECTION, SOLUTION INTRAVENOUS at 16:33

## 2025-04-23 RX ADMIN — SODIUM CHLORIDE, PRESERVATIVE FREE 10 ML: 5 INJECTION INTRAVENOUS at 16:33

## 2025-05-01 ENCOUNTER — APPOINTMENT (OUTPATIENT)
Dept: GENERAL RADIOLOGY | Age: 50
End: 2025-05-01
Payer: COMMERCIAL

## 2025-05-01 ENCOUNTER — APPOINTMENT (OUTPATIENT)
Dept: CT IMAGING | Age: 50
End: 2025-05-01
Payer: COMMERCIAL

## 2025-05-01 ENCOUNTER — HOSPITAL ENCOUNTER (INPATIENT)
Age: 50
LOS: 1 days | Discharge: HOME | End: 2025-05-03
Attending: EMERGENCY MEDICINE | Admitting: INTERNAL MEDICINE
Payer: COMMERCIAL

## 2025-05-01 DIAGNOSIS — L03.116 CELLULITIS OF LEFT LOWER EXTREMITY: Primary | ICD-10-CM

## 2025-05-01 DIAGNOSIS — J18.9 PNEUMONIA DUE TO INFECTIOUS ORGANISM, UNSPECIFIED LATERALITY, UNSPECIFIED PART OF LUNG: ICD-10-CM

## 2025-05-01 DIAGNOSIS — R09.02 HYPOXEMIA: ICD-10-CM

## 2025-05-01 DIAGNOSIS — R42 DIZZINESS: ICD-10-CM

## 2025-05-01 LAB
ANION GAP SERPL CALCULATED.3IONS-SCNC: 12 MMOL/L (ref 9–16)
BASOPHILS # BLD: 0.1 K/UL (ref 0–0.2)
BASOPHILS NFR BLD: 1 % (ref 0–2)
BNP SERPL-MCNC: 74 PG/ML (ref 0–300)
BUN SERPL-MCNC: 11 MG/DL (ref 6–20)
CALCIUM SERPL-MCNC: 8.8 MG/DL (ref 8.6–10.4)
CHLORIDE SERPL-SCNC: 99 MMOL/L (ref 98–107)
CO2 SERPL-SCNC: 30 MMOL/L (ref 20–31)
CREAT SERPL-MCNC: 0.8 MG/DL (ref 0.7–1.2)
EOSINOPHIL # BLD: 0.2 K/UL (ref 0–0.4)
EOSINOPHILS RELATIVE PERCENT: 2 % (ref 0–4)
ERYTHROCYTE [DISTWIDTH] IN BLOOD BY AUTOMATED COUNT: 16.8 % (ref 11.5–14.9)
GFR, ESTIMATED: 89 ML/MIN/1.73M2
GLUCOSE SERPL-MCNC: 253 MG/DL (ref 74–99)
HCT VFR BLD AUTO: 36.9 % (ref 36–46)
HGB BLD-MCNC: 11.9 G/DL (ref 12–16)
INR PPP: 1
LYMPHOCYTES NFR BLD: 1.9 K/UL (ref 1–4.8)
LYMPHOCYTES RELATIVE PERCENT: 21 % (ref 24–44)
MAGNESIUM SERPL-MCNC: 1.8 MG/DL (ref 1.6–2.6)
MCH RBC QN AUTO: 27.5 PG (ref 26–34)
MCHC RBC AUTO-ENTMCNC: 32.3 G/DL (ref 31–37)
MCV RBC AUTO: 85.2 FL (ref 80–100)
MONOCYTES NFR BLD: 0.4 K/UL (ref 0.1–1.3)
MONOCYTES NFR BLD: 4 % (ref 1–7)
NEUTROPHILS NFR BLD: 72 % (ref 36–66)
NEUTS SEG NFR BLD: 6.5 K/UL (ref 1.3–9.1)
PARTIAL THROMBOPLASTIN TIME: 31.6 SEC (ref 24–36)
PHOSPHATE SERPL-MCNC: 2.9 MG/DL (ref 2.5–4.5)
PLATELET # BLD AUTO: 399 K/UL (ref 150–450)
PMV BLD AUTO: 6.9 FL (ref 6–12)
POTASSIUM SERPL-SCNC: 3.9 MMOL/L (ref 3.7–5.3)
PROTHROMBIN TIME: 14.5 SEC (ref 11.8–14.6)
RBC # BLD AUTO: 4.33 M/UL (ref 4–5.2)
SODIUM SERPL-SCNC: 141 MMOL/L (ref 136–145)
TROPONIN I SERPL HS-MCNC: 15 NG/L (ref 0–14)
TROPONIN I SERPL HS-MCNC: 8 NG/L (ref 0–14)
WBC OTHER # BLD: 9 K/UL (ref 3.5–11)

## 2025-05-01 PROCEDURE — 80048 BASIC METABOLIC PNL TOTAL CA: CPT

## 2025-05-01 PROCEDURE — 36415 COLL VENOUS BLD VENIPUNCTURE: CPT

## 2025-05-01 PROCEDURE — 84100 ASSAY OF PHOSPHORUS: CPT

## 2025-05-01 PROCEDURE — 83880 ASSAY OF NATRIURETIC PEPTIDE: CPT

## 2025-05-01 PROCEDURE — 83735 ASSAY OF MAGNESIUM: CPT

## 2025-05-01 PROCEDURE — 85730 THROMBOPLASTIN TIME PARTIAL: CPT

## 2025-05-01 PROCEDURE — 85025 COMPLETE CBC W/AUTO DIFF WBC: CPT

## 2025-05-01 PROCEDURE — 70450 CT HEAD/BRAIN W/O DYE: CPT

## 2025-05-01 PROCEDURE — 84484 ASSAY OF TROPONIN QUANT: CPT

## 2025-05-01 PROCEDURE — 71045 X-RAY EXAM CHEST 1 VIEW: CPT

## 2025-05-01 PROCEDURE — 99285 EMERGENCY DEPT VISIT HI MDM: CPT

## 2025-05-01 PROCEDURE — 93005 ELECTROCARDIOGRAM TRACING: CPT | Performed by: EMERGENCY MEDICINE

## 2025-05-01 PROCEDURE — 85610 PROTHROMBIN TIME: CPT

## 2025-05-01 ASSESSMENT — ENCOUNTER SYMPTOMS
PHOTOPHOBIA: 0
SHORTNESS OF BREATH: 0
BACK PAIN: 0
COLOR CHANGE: 0
ABDOMINAL PAIN: 0
FACIAL SWELLING: 0
EYE PAIN: 0
TROUBLE SWALLOWING: 0
VOMITING: 0
CHEST TIGHTNESS: 0
NAUSEA: 0
VOICE CHANGE: 0

## 2025-05-02 PROBLEM — G44.209 ACUTE NON INTRACTABLE TENSION-TYPE HEADACHE: Status: ACTIVE | Noted: 2025-05-02

## 2025-05-02 PROBLEM — R09.02 HYPOXIA: Status: ACTIVE | Noted: 2025-05-02

## 2025-05-02 PROBLEM — G43.909 EPISODIC MIGRAINE: Status: ACTIVE | Noted: 2025-05-02

## 2025-05-02 PROBLEM — L03.116 CELLULITIS OF LEFT LOWER EXTREMITY: Status: ACTIVE | Noted: 2025-05-02

## 2025-05-02 LAB
AMPHET UR QL SCN: NEGATIVE
ANION GAP SERPL CALCULATED.3IONS-SCNC: 10 MMOL/L (ref 9–16)
ANION GAP SERPL CALCULATED.3IONS-SCNC: 8 MMOL/L (ref 9–16)
BACTERIA URNS QL MICRO: ABNORMAL
BARBITURATES UR QL SCN: NEGATIVE
BASOPHILS # BLD: 0.1 K/UL (ref 0–0.2)
BASOPHILS NFR BLD: 1 % (ref 0–2)
BENZODIAZ UR QL: POSITIVE
BILIRUB UR QL STRIP: NEGATIVE
BILIRUB UR QL STRIP: NEGATIVE
BODY TEMPERATURE: 37
BUN SERPL-MCNC: 9 MG/DL (ref 6–20)
CALCIUM SERPL-MCNC: 8 MG/DL (ref 8.6–10.4)
CANNABINOIDS UR QL SCN: NEGATIVE
CASTS #/AREA URNS LPF: ABNORMAL /LPF
CHLORIDE SERPL-SCNC: 104 MMOL/L (ref 98–107)
CHLORIDE SERPL-SCNC: 106 MMOL/L (ref 98–107)
CLARITY UR: ABNORMAL
CLARITY UR: CLEAR
CO2 SERPL-SCNC: 26 MMOL/L (ref 20–31)
CO2 SERPL-SCNC: 29 MMOL/L (ref 20–31)
COCAINE UR QL SCN: NEGATIVE
COHGB MFR BLD: 3.6 % (ref 0–5)
COLOR UR: YELLOW
COLOR UR: YELLOW
COMMENT: NORMAL
CREAT SERPL-MCNC: 0.6 MG/DL (ref 0.7–1.2)
EKG ATRIAL RATE: 85 BPM
EKG P AXIS: 35 DEGREES
EKG P-R INTERVAL: 176 MS
EKG Q-T INTERVAL: 396 MS
EKG QRS DURATION: 88 MS
EKG QTC CALCULATION (BAZETT): 471 MS
EKG R AXIS: -15 DEGREES
EKG T AXIS: 44 DEGREES
EKG VENTRICULAR RATE: 85 BPM
EOSINOPHIL # BLD: 0.2 K/UL (ref 0–0.4)
EOSINOPHILS RELATIVE PERCENT: 3 % (ref 0–4)
EPI CELLS #/AREA URNS HPF: ABNORMAL /HPF
ERYTHROCYTE [DISTWIDTH] IN BLOOD BY AUTOMATED COUNT: 17 % (ref 11.5–14.9)
FENTANYL UR QL: NEGATIVE
GAS FLOW.O2 O2 DELIVERY SYS: ABNORMAL L/MIN
GFR, ESTIMATED: >90 ML/MIN/1.73M2
GLUCOSE SERPL-MCNC: 132 MG/DL (ref 74–99)
GLUCOSE UR STRIP-MCNC: NEGATIVE MG/DL
GLUCOSE UR STRIP-MCNC: NEGATIVE MG/DL
HCO3 VENOUS: 21.5 MMOL/L (ref 24–30)
HCT VFR BLD AUTO: 32.6 % (ref 36–46)
HGB BLD-MCNC: 10.8 G/DL (ref 12–16)
HGB UR QL STRIP.AUTO: NEGATIVE
HGB UR QL STRIP.AUTO: NEGATIVE
KETONES UR STRIP-MCNC: NEGATIVE MG/DL
KETONES UR STRIP-MCNC: NEGATIVE MG/DL
LEUKOCYTE ESTERASE UR QL STRIP: ABNORMAL
LEUKOCYTE ESTERASE UR QL STRIP: NEGATIVE
LYMPHOCYTES NFR BLD: 2 K/UL (ref 1–4.8)
LYMPHOCYTES RELATIVE PERCENT: 31 % (ref 24–44)
MAGNESIUM SERPL-MCNC: 1.7 MG/DL (ref 1.6–2.6)
MCH RBC QN AUTO: 27.8 PG (ref 26–34)
MCHC RBC AUTO-ENTMCNC: 33 G/DL (ref 31–37)
MCV RBC AUTO: 84.3 FL (ref 80–100)
METHADONE UR QL: NEGATIVE
METHEMOGLOBIN: 0.3 % (ref 0–1.9)
MONOCYTES NFR BLD: 0.4 K/UL (ref 0.1–1.3)
MONOCYTES NFR BLD: 7 % (ref 1–7)
NEUTROPHILS NFR BLD: 58 % (ref 36–66)
NEUTS SEG NFR BLD: 3.9 K/UL (ref 1.3–9.1)
NITRITE UR QL STRIP: NEGATIVE
NITRITE UR QL STRIP: NEGATIVE
O2 SAT, VEN: 94.7 % (ref 60–85)
OPIATES UR QL SCN: NEGATIVE
OXYCODONE UR QL SCN: NEGATIVE
PCO2 VENOUS: 24 MM HG (ref 39–55)
PCP UR QL SCN: NEGATIVE
PH UR STRIP: 6 [PH] (ref 5–8)
PH UR STRIP: 6 [PH] (ref 5–8)
PH VENOUS: 7.57 (ref 7.32–7.42)
PLATELET # BLD AUTO: 330 K/UL (ref 150–450)
PMV BLD AUTO: 6.6 FL (ref 6–12)
PO2 VENOUS: 183.1 MM HG (ref 30–50)
POSITIVE BASE EXCESS, VEN: 0.7 MMOL/L (ref 0–2)
POTASSIUM SERPL-SCNC: 2.8 MMOL/L (ref 3.7–5.3)
POTASSIUM SERPL-SCNC: 3.9 MMOL/L (ref 3.7–5.3)
POTASSIUM SERPL-SCNC: 4 MMOL/L (ref 3.7–5.3)
PROCALCITONIN SERPL-MCNC: 0.03 NG/ML (ref 0–0.09)
PROCALCITONIN SERPL-MCNC: 0.03 NG/ML (ref 0–0.09)
PROT UR STRIP-MCNC: NEGATIVE MG/DL
PROT UR STRIP-MCNC: NEGATIVE MG/DL
PT. POSITION: ABNORMAL
RBC # BLD AUTO: 3.87 M/UL (ref 4–5.2)
RBC #/AREA URNS HPF: ABNORMAL /HPF
SODIUM SERPL-SCNC: 140 MMOL/L (ref 136–145)
SODIUM SERPL-SCNC: 143 MMOL/L (ref 136–145)
SP GR UR STRIP: 1.01 (ref 1–1.03)
SP GR UR STRIP: 1.01 (ref 1–1.03)
T4 SERPL-MCNC: 4.2 UG/DL (ref 4.5–11.7)
TEST INFORMATION: ABNORMAL
TSH SERPL DL<=0.05 MIU/L-ACNC: 15.7 UIU/ML (ref 0.27–4.2)
UROBILINOGEN UR STRIP-ACNC: NORMAL EU/DL (ref 0–1)
UROBILINOGEN UR STRIP-ACNC: NORMAL EU/DL (ref 0–1)
WBC #/AREA URNS HPF: ABNORMAL /HPF
WBC OTHER # BLD: 6.5 K/UL (ref 3.5–11)

## 2025-05-02 PROCEDURE — 82805 BLOOD GASES W/O2 SATURATION: CPT

## 2025-05-02 PROCEDURE — 6360000002 HC RX W HCPCS: Performed by: EMERGENCY MEDICINE

## 2025-05-02 PROCEDURE — 2580000003 HC RX 258

## 2025-05-02 PROCEDURE — 84436 ASSAY OF TOTAL THYROXINE: CPT

## 2025-05-02 PROCEDURE — 6370000000 HC RX 637 (ALT 250 FOR IP)

## 2025-05-02 PROCEDURE — 82800 BLOOD PH: CPT

## 2025-05-02 PROCEDURE — 85025 COMPLETE CBC W/AUTO DIFF WBC: CPT

## 2025-05-02 PROCEDURE — 93010 ELECTROCARDIOGRAM REPORT: CPT | Performed by: INTERNAL MEDICINE

## 2025-05-02 PROCEDURE — 84132 ASSAY OF SERUM POTASSIUM: CPT

## 2025-05-02 PROCEDURE — 80051 ELECTROLYTE PANEL: CPT

## 2025-05-02 PROCEDURE — 80048 BASIC METABOLIC PNL TOTAL CA: CPT

## 2025-05-02 PROCEDURE — 2060000000 HC ICU INTERMEDIATE R&B

## 2025-05-02 PROCEDURE — 2580000003 HC RX 258: Performed by: EMERGENCY MEDICINE

## 2025-05-02 PROCEDURE — 2500000003 HC RX 250 WO HCPCS: Performed by: EMERGENCY MEDICINE

## 2025-05-02 PROCEDURE — 80307 DRUG TEST PRSMV CHEM ANLYZR: CPT

## 2025-05-02 PROCEDURE — 83735 ASSAY OF MAGNESIUM: CPT

## 2025-05-02 PROCEDURE — 6370000000 HC RX 637 (ALT 250 FOR IP): Performed by: NURSE PRACTITIONER

## 2025-05-02 PROCEDURE — 81003 URINALYSIS AUTO W/O SCOPE: CPT

## 2025-05-02 PROCEDURE — 99223 1ST HOSP IP/OBS HIGH 75: CPT | Performed by: INTERNAL MEDICINE

## 2025-05-02 PROCEDURE — 99223 1ST HOSP IP/OBS HIGH 75: CPT | Performed by: PSYCHIATRY & NEUROLOGY

## 2025-05-02 PROCEDURE — 6360000002 HC RX W HCPCS

## 2025-05-02 PROCEDURE — 2500000003 HC RX 250 WO HCPCS

## 2025-05-02 PROCEDURE — 36415 COLL VENOUS BLD VENIPUNCTURE: CPT

## 2025-05-02 PROCEDURE — 84145 PROCALCITONIN (PCT): CPT

## 2025-05-02 PROCEDURE — 84443 ASSAY THYROID STIM HORMONE: CPT

## 2025-05-02 PROCEDURE — 87641 MR-STAPH DNA AMP PROBE: CPT

## 2025-05-02 PROCEDURE — 81001 URINALYSIS AUTO W/SCOPE: CPT

## 2025-05-02 RX ORDER — MAGNESIUM SULFATE 1 G/100ML
1000 INJECTION INTRAVENOUS ONCE
Status: COMPLETED | OUTPATIENT
Start: 2025-05-02 | End: 2025-05-02

## 2025-05-02 RX ORDER — SODIUM CHLORIDE 9 MG/ML
INJECTION, SOLUTION INTRAVENOUS PRN
Status: DISCONTINUED | OUTPATIENT
Start: 2025-05-02 | End: 2025-05-03 | Stop reason: HOSPADM

## 2025-05-02 RX ORDER — AMITRIPTYLINE HYDROCHLORIDE 50 MG/1
50 TABLET ORAL NIGHTLY PRN
Status: DISCONTINUED | OUTPATIENT
Start: 2025-05-02 | End: 2025-05-03 | Stop reason: HOSPADM

## 2025-05-02 RX ORDER — ONDANSETRON 4 MG/1
4 TABLET, ORALLY DISINTEGRATING ORAL EVERY 8 HOURS PRN
Status: DISCONTINUED | OUTPATIENT
Start: 2025-05-02 | End: 2025-05-03 | Stop reason: HOSPADM

## 2025-05-02 RX ORDER — ONDANSETRON 2 MG/ML
4 INJECTION INTRAMUSCULAR; INTRAVENOUS EVERY 6 HOURS PRN
Status: DISCONTINUED | OUTPATIENT
Start: 2025-05-02 | End: 2025-05-03 | Stop reason: HOSPADM

## 2025-05-02 RX ORDER — MAGNESIUM SULFATE HEPTAHYDRATE 40 MG/ML
2000 INJECTION, SOLUTION INTRAVENOUS PRN
Status: DISCONTINUED | OUTPATIENT
Start: 2025-05-02 | End: 2025-05-03 | Stop reason: HOSPADM

## 2025-05-02 RX ORDER — POTASSIUM CHLORIDE 1500 MG/1
40 TABLET, EXTENDED RELEASE ORAL PRN
Status: DISCONTINUED | OUTPATIENT
Start: 2025-05-02 | End: 2025-05-03 | Stop reason: HOSPADM

## 2025-05-02 RX ORDER — ALBUTEROL SULFATE 90 UG/1
2 INHALANT RESPIRATORY (INHALATION) EVERY 6 HOURS PRN
Status: DISCONTINUED | OUTPATIENT
Start: 2025-05-02 | End: 2025-05-03 | Stop reason: HOSPADM

## 2025-05-02 RX ORDER — CITALOPRAM HYDROBROMIDE 20 MG/10ML
20 SOLUTION, ORAL ORAL DAILY
Status: DISCONTINUED | OUTPATIENT
Start: 2025-05-02 | End: 2025-05-02

## 2025-05-02 RX ORDER — BUPRENORPHINE AND NALOXONE 8; 2 MG/1; MG/1
1 FILM, SOLUBLE BUCCAL; SUBLINGUAL 2 TIMES DAILY
Status: DISCONTINUED | OUTPATIENT
Start: 2025-05-02 | End: 2025-05-03 | Stop reason: HOSPADM

## 2025-05-02 RX ORDER — SCOPOLAMINE 1 MG/3D
1 PATCH, EXTENDED RELEASE TRANSDERMAL
Status: DISCONTINUED | OUTPATIENT
Start: 2025-05-02 | End: 2025-05-03 | Stop reason: HOSPADM

## 2025-05-02 RX ORDER — ACETAMINOPHEN 325 MG/1
650 TABLET ORAL EVERY 6 HOURS PRN
Status: DISCONTINUED | OUTPATIENT
Start: 2025-05-02 | End: 2025-05-03 | Stop reason: HOSPADM

## 2025-05-02 RX ORDER — BISACODYL 10 MG
10 SUPPOSITORY, RECTAL RECTAL DAILY PRN
Status: DISCONTINUED | OUTPATIENT
Start: 2025-05-02 | End: 2025-05-03 | Stop reason: HOSPADM

## 2025-05-02 RX ORDER — ESCITALOPRAM OXALATE 10 MG/1
10 TABLET ORAL DAILY
Status: DISCONTINUED | OUTPATIENT
Start: 2025-05-02 | End: 2025-05-03 | Stop reason: HOSPADM

## 2025-05-02 RX ORDER — POTASSIUM CHLORIDE 7.45 MG/ML
10 INJECTION INTRAVENOUS PRN
Status: DISCONTINUED | OUTPATIENT
Start: 2025-05-02 | End: 2025-05-03 | Stop reason: HOSPADM

## 2025-05-02 RX ORDER — ACETAMINOPHEN 650 MG/1
650 SUPPOSITORY RECTAL EVERY 6 HOURS PRN
Status: DISCONTINUED | OUTPATIENT
Start: 2025-05-02 | End: 2025-05-03 | Stop reason: HOSPADM

## 2025-05-02 RX ORDER — SODIUM CHLORIDE 0.9 % (FLUSH) 0.9 %
10 SYRINGE (ML) INJECTION PRN
Status: DISCONTINUED | OUTPATIENT
Start: 2025-05-02 | End: 2025-05-03 | Stop reason: HOSPADM

## 2025-05-02 RX ORDER — SODIUM CHLORIDE 9 MG/ML
INJECTION, SOLUTION INTRAVENOUS CONTINUOUS
Status: ACTIVE | OUTPATIENT
Start: 2025-05-02 | End: 2025-05-03

## 2025-05-02 RX ORDER — SODIUM CHLORIDE 9 MG/ML
INJECTION, SOLUTION INTRAVENOUS CONTINUOUS
Status: DISCONTINUED | OUTPATIENT
Start: 2025-05-02 | End: 2025-05-02

## 2025-05-02 RX ORDER — POLYETHYLENE GLYCOL 3350 17 G/17G
17 POWDER, FOR SOLUTION ORAL DAILY PRN
Status: DISCONTINUED | OUTPATIENT
Start: 2025-05-02 | End: 2025-05-03 | Stop reason: HOSPADM

## 2025-05-02 RX ORDER — BUPRENORPHINE AND NALOXONE 8; 2 MG/1; MG/1
1 FILM, SOLUBLE BUCCAL; SUBLINGUAL DAILY
Status: DISCONTINUED | OUTPATIENT
Start: 2025-05-02 | End: 2025-05-02

## 2025-05-02 RX ORDER — KETOROLAC TROMETHAMINE 30 MG/ML
15 INJECTION, SOLUTION INTRAMUSCULAR; INTRAVENOUS EVERY 8 HOURS PRN
Status: DISCONTINUED | OUTPATIENT
Start: 2025-05-02 | End: 2025-05-03 | Stop reason: HOSPADM

## 2025-05-02 RX ORDER — LEVOTHYROXINE SODIUM 100 UG/1
200 TABLET ORAL
Status: DISCONTINUED | OUTPATIENT
Start: 2025-05-02 | End: 2025-05-03 | Stop reason: HOSPADM

## 2025-05-02 RX ORDER — FAMOTIDINE 20 MG/1
20 TABLET, FILM COATED ORAL 2 TIMES DAILY
Status: DISCONTINUED | OUTPATIENT
Start: 2025-05-02 | End: 2025-05-03 | Stop reason: HOSPADM

## 2025-05-02 RX ORDER — ENOXAPARIN SODIUM 100 MG/ML
30 INJECTION SUBCUTANEOUS 2 TIMES DAILY
Status: DISCONTINUED | OUTPATIENT
Start: 2025-05-02 | End: 2025-05-03 | Stop reason: HOSPADM

## 2025-05-02 RX ORDER — ALPRAZOLAM 1 MG/1
1 TABLET ORAL 2 TIMES DAILY PRN
Status: DISCONTINUED | OUTPATIENT
Start: 2025-05-02 | End: 2025-05-03 | Stop reason: HOSPADM

## 2025-05-02 RX ORDER — SODIUM CHLORIDE 0.9 % (FLUSH) 0.9 %
5-40 SYRINGE (ML) INJECTION EVERY 12 HOURS SCHEDULED
Status: DISCONTINUED | OUTPATIENT
Start: 2025-05-02 | End: 2025-05-03 | Stop reason: HOSPADM

## 2025-05-02 RX ORDER — CITALOPRAM HYDROBROMIDE 20 MG/1
10 TABLET ORAL DAILY
Status: DISCONTINUED | OUTPATIENT
Start: 2025-05-02 | End: 2025-05-02 | Stop reason: CLARIF

## 2025-05-02 RX ADMIN — LEVOTHYROXINE SODIUM 200 MCG: 0.1 TABLET ORAL at 05:04

## 2025-05-02 RX ADMIN — MAGNESIUM SULFATE HEPTAHYDRATE 1000 MG: 1 INJECTION, SOLUTION INTRAVENOUS at 10:54

## 2025-05-02 RX ADMIN — FAMOTIDINE 20 MG: 20 TABLET, FILM COATED ORAL at 09:32

## 2025-05-02 RX ADMIN — AZITHROMYCIN MONOHYDRATE 500 MG: 500 INJECTION, POWDER, LYOPHILIZED, FOR SOLUTION INTRAVENOUS at 02:40

## 2025-05-02 RX ADMIN — POTASSIUM CHLORIDE 10 MEQ: 7.46 INJECTION, SOLUTION INTRAVENOUS at 14:18

## 2025-05-02 RX ADMIN — MICONAZOLE NITRATE: 20 POWDER TOPICAL at 20:38

## 2025-05-02 RX ADMIN — POTASSIUM CHLORIDE 10 MEQ: 7.46 INJECTION, SOLUTION INTRAVENOUS at 13:06

## 2025-05-02 RX ADMIN — SODIUM CHLORIDE: 0.9 INJECTION, SOLUTION INTRAVENOUS at 03:53

## 2025-05-02 RX ADMIN — ENOXAPARIN SODIUM 30 MG: 100 INJECTION SUBCUTANEOUS at 09:33

## 2025-05-02 RX ADMIN — WATER 1000 MG: 1 INJECTION INTRAMUSCULAR; INTRAVENOUS; SUBCUTANEOUS at 02:32

## 2025-05-02 RX ADMIN — BUPRENORPHINE AND NALOXONE 1 FILM: 8; 2 FILM, SOLUBLE BUCCAL; SUBLINGUAL at 09:33

## 2025-05-02 RX ADMIN — FAMOTIDINE 20 MG: 20 TABLET, FILM COATED ORAL at 20:37

## 2025-05-02 RX ADMIN — BUPRENORPHINE AND NALOXONE 1 FILM: 8; 2 FILM, SOLUBLE BUCCAL; SUBLINGUAL at 20:37

## 2025-05-02 RX ADMIN — ESCITALOPRAM OXALATE 10 MG: 10 TABLET ORAL at 09:32

## 2025-05-02 RX ADMIN — POTASSIUM CHLORIDE 10 MEQ: 7.46 INJECTION, SOLUTION INTRAVENOUS at 06:56

## 2025-05-02 RX ADMIN — MICONAZOLE NITRATE: 20 POWDER TOPICAL at 11:38

## 2025-05-02 RX ADMIN — SODIUM CHLORIDE: 0.9 INJECTION, SOLUTION INTRAVENOUS at 17:50

## 2025-05-02 RX ADMIN — POTASSIUM CHLORIDE 10 MEQ: 7.46 INJECTION, SOLUTION INTRAVENOUS at 11:36

## 2025-05-02 RX ADMIN — POTASSIUM CHLORIDE 10 MEQ: 7.46 INJECTION, SOLUTION INTRAVENOUS at 09:19

## 2025-05-02 RX ADMIN — ALPRAZOLAM 1 MG: 1 TABLET ORAL at 11:35

## 2025-05-02 RX ADMIN — ENOXAPARIN SODIUM 30 MG: 100 INJECTION SUBCUTANEOUS at 20:37

## 2025-05-02 RX ADMIN — AMITRIPTYLINE HYDROCHLORIDE 50 MG: 50 TABLET, FILM COATED ORAL at 20:37

## 2025-05-02 RX ADMIN — VANCOMYCIN HYDROCHLORIDE 2000 MG: 1 INJECTION, POWDER, LYOPHILIZED, FOR SOLUTION INTRAVENOUS at 04:41

## 2025-05-02 RX ADMIN — POTASSIUM CHLORIDE 10 MEQ: 7.46 INJECTION, SOLUTION INTRAVENOUS at 15:22

## 2025-05-02 ASSESSMENT — ENCOUNTER SYMPTOMS
DIARRHEA: 0
SHORTNESS OF BREATH: 0
ABDOMINAL PAIN: 0
NAUSEA: 0
COUGH: 0
ABDOMINAL DISTENTION: 0

## 2025-05-02 ASSESSMENT — HEART SCORE: ECG: NORMAL

## 2025-05-02 NOTE — PROGRESS NOTES
Patient was admitted to PCU and was able to ambulate a short distance from wheelchair to bed. Patient was assessed, vitals were taken, 4 eyes skin assessment was completed, and patient was placed in fall precautions.

## 2025-05-02 NOTE — PLAN OF CARE
Problem: Discharge Planning  Goal: Discharge to home or other facility with appropriate resources  5/2/2025 1830 by Adan Quintero, RN  Outcome: Progressing     Problem: Safety - Adult  Goal: Free from fall injury  5/2/2025 1830 by Adan Quintero, RN  Outcome: Progressing     Problem: ABCDS Injury Assessment  Goal: Absence of physical injury  5/2/2025 1830 by Adan Quintero, RN  Outcome: Progressing

## 2025-05-02 NOTE — PROGRESS NOTES
[facility] Internal Medicine  Yevgeniy Mims MD; Reji Hernandes MD; Deven Chaudhary MD; MD Karely Shah MD; Odette Berry MD  [facility] Internal Medicine   IN-PATIENT SERVICE  [facility]                 Date:   5/2/2025  Patientname:  Suzi Barnes  Date of admission:  5/1/2025  9:44 PM  MRN:   [id number]  Account:  [id number]  YOB: 1975  PCP:    Nathalie Mayen DO  Room:   03/03  Code Status:    Full      Chief Complaint:     Chief Complaint   Patient presents with    Leg Swelling    Dizziness    Tinnitus    Headache       History of Present Illness:     Suzi Barnes is a 49 y.o. Non- / non  female with a history of hypothyroidism and Suboxone use who presents with Leg Swelling, Dizziness, Tinnitus, and Headache   and is admitted to the hospital for the management of Hypoxia.    According to patient, she is concerned due to headache, tinnitus, bilateral lower extremity edema dizziness and rash in her left lower extremity.  She states that she was here 2 days ago for a scheduled MRI after repeated falls.  She endorses hitting her head during her last fall.    Upon presentation to the ER the patient was lethargic but alert and oriented.  Bilateral lymphedema noted.  Skin fold redness noted particularly on the right lower extremity.  Suspect fungal origin.  Chart review reveals that the patient has been seen by vascular for lymphedema in the past and has been advised to wear compression stockings.    her initial troponin was 15 which down trended to 8.  Her EKG was not concerning.  Her CT head was negative for acute findings, however, unfortunately her chest x-ray was notable for possible pneumonia.  She became hypoxic with an SpO2 of 89%.  Oxygen applied.    Plan to admit for pneumonia, hypoxia.  IV antibiotics. Patient endorses being current with Suboxone.  Urinalysis concerning for UTI.    Past Medical History:     Past Medical

## 2025-05-02 NOTE — CARE COORDINATION
Case Management Assessment  Initial Evaluation    Date/Time of Evaluation: 5/2/2025 12:12 PM  Assessment Completed by: Mer Figueroa RN    If patient is discharged prior to next notation, then this note serves as note for discharge by case management.    Patient Name: Suzi Barnes                   YOB: 1975  Diagnosis: Hypoxemia [R09.02]  Dizziness [R42]  Hypoxia [R09.02]  Cellulitis of left lower extremity [L03.116]  Pneumonia due to infectious organism, unspecified laterality, unspecified part of lung [J18.9]                   Date / Time: 5/1/2025  9:44 PM    Patient Admission Status: Inpatient   Readmission Risk (Low < 19, Mod (19-27), High > 27): Readmission Risk Score: 8.4    Current PCP: Nathalie Mayen, DO  PCP verified by CM? Yes    Chart Reviewed: Yes      History Provided by: Patient  Patient Orientation: Alert and Oriented    Patient Cognition: Alert    Hospitalization in the last 30 days (Readmission):  No    If yes, Readmission Assessment in CM Navigator will be completed.    Advance Directives:      Code Status: Full Code   Patient's Primary Decision Maker is: Legal Next of Kin      Discharge Planning:    Patient lives with: Family Members (sister, Nereida) Type of Home: Apartment  Primary Care Giver: Self  Patient Support Systems include: Family Members   Current Financial resources: Medicaid  Current community resources: [employer] (Suboxone from \"[employer]\" online)  Current services prior to admission: Durable Medical Equipment            Current DME: Walker            Type of Home Care services:  None    ADLS  Prior functional level: Independent in ADLs/IADLs  Current functional level: Independent in ADLs/IADLs    PT AM-PAC:   /24  OT AM-PAC:   /24    Family can provide assistance at DC: Yes  Would you like Case Management to discuss the discharge plan with any other family members/significant others, and if so, who? No  Plans to Return to Present Housing: Yes  Other  Identified Issues/Barriers to RETURNING to current housing: no  Potential Assistance needed at discharge: N/A            Potential DME:    Patient expects to discharge to: Apartment  Plan for transportation at discharge: Self    Financial    [place]: [email or web address] [place] / Plan: [employer] [place] / Product Type: *No Product type* /     Does insurance require precert for SNF: Yes    Potential assistance Purchasing Medications: No  Meds-to-Beds request:        [id number] - [place], [place] - [address] - P [phone] - F [phone]  [address] [facility] [address]  Phone: [phone] Fax: [phone]    [facility] - [place], [place] - [address] - P [phone] - F [phone]  [address] [facility] [address]  Phone: [phone] Fax: [phone]      Notes:    Factors facilitating achievement of predicted outcomes: Family support, Cooperative, and Pleasant    Barriers to discharge: Lower extremity weakness    Additional Case Management Notes: 5/2: [employer] [place]. From apt with sister, Nereida, independent and drives. DME: walker. VNS: none/declined. Denies needs. On Suboxone through \"[employer]\" online. Recent MRI Brain negative. IV Zithro, Rocephin, Vanco. F/U PCP 5/27 @ 1:45pm. //AR     The Plan for Transition of Care is related to the following treatment goals of Hypoxemia [R09.02]  Dizziness [R42]  Hypoxia [R09.02]  Cellulitis of left lower extremity [L03.116]  Pneumonia due to infectious organism, unspecified laterality, unspecified part of lung [J18.9]    IF APPLICABLE: The Patient and/or patient representative Suzi and her family were provided with a choice of provider and agrees with the discharge plan. Freedom of choice list with basic dialogue that supports the patient's individualized plan of care/goals and shares the quality data associated with the providers was provided to: Patient   Patient Representative Name:       The Patient and/or Patient

## 2025-05-02 NOTE — ED PROVIDER NOTES
EMERGENCY DEPARTMENT ENCOUNTER    Pt Name: Suzi Barnes  MRN: [id number]  Birthdate 1975  Date of evaluation: 5/1/25  CHIEF COMPLAINT       Chief Complaint   Patient presents with    Leg Swelling    Dizziness    Tinnitus    Headache     HISTORY OF PRESENT ILLNESS   49-year-old female presenting to the ER complaining of bilateral lower extremity edema as well as a rash on the left lower extremity.  Patient is concerned about cellulitis.  Patient also feels dizziness and a headache.  The patient had a fall 2 weeks ago and was assessed with an MRI but is unsure what the results were.    The history is provided by the patient.   Dizziness  Quality:  Lightheadedness  Associated symptoms: headaches    Associated symptoms: no chest pain, no nausea, no palpitations, no shortness of breath and no vomiting            REVIEW OF SYSTEMS     Review of Systems   Constitutional:  Negative for activity change, appetite change and fatigue.   HENT:  Negative for facial swelling, trouble swallowing and voice change.    Eyes:  Negative for photophobia and pain.   Respiratory:  Negative for chest tightness and shortness of breath.    Cardiovascular:  Positive for leg swelling (BLE). Negative for chest pain and palpitations.   Gastrointestinal:  Negative for abdominal pain, nausea and vomiting.   Genitourinary:  Negative for dysuria and urgency.   Musculoskeletal:  Negative for arthralgias and back pain.   Skin:  Positive for rash (LLE upper leg). Negative for color change.   Neurological:  Positive for dizziness, light-headedness and headaches. Negative for syncope.   Psychiatric/Behavioral:  Negative for behavioral problems and hallucinations.      PASTMEDICAL HISTORY     Past Medical History:   Diagnosis Date    Hypothyroid      Past Problem List  Patient Active Problem List   Diagnosis Code    Bilateral swelling of feet M79.89    Hypothyroidism E03.9    Bilateral lower extremity edema R60.0    Constipation K59.00

## 2025-05-02 NOTE — PLAN OF CARE
Problem: Discharge Planning  Goal: Discharge to home or other facility with appropriate resources  Outcome: Progressing  Flowsheets (Taken 5/2/2025 0720)  Discharge to home or other facility with appropriate resources:   Identify barriers to discharge with patient and caregiver   Arrange for needed discharge resources and transportation as appropriate   Identify discharge learning needs (meds, wound care, etc)   Refer to discharge planning if patient needs post-hospital services based on physician order or complex needs related to functional status, cognitive ability or social support system     Problem: Safety - Adult  Goal: Free from fall injury  Outcome: Progressing  Flowsheets (Taken 5/2/2025 0720)  Free From Fall Injury: Instruct family/caregiver on patient safety     Problem: ABCDS Injury Assessment  Goal: Absence of physical injury  Outcome: Progressing  Flowsheets (Taken 5/2/2025 0720)  Absence of Physical Injury: Implement safety measures based on patient assessment

## 2025-05-02 NOTE — H&P
[facility] Internal Medicine  Deven Chaudhary MD; Galo Peterson MD, Albertina Rebolledo MD,    Odette Berry MD, Elena Finney MD.    [facility] Internal Medicine   IN-PATIENT SERVICE   [facility]    HISTORY AND PHYSICAL EXAMINATION            Date:   5/2/2025  Patient name:  Suzi Barnes  Date of admission:  5/1/2025  9:44 PM  MRN:   [id number]  Account:  [id number]  YOB: 1975  PCP:    Nathalie Mayen DO  Room:   2093/2093-01  Code Status:    Full Code    Chief Complaint:     Chief Complaint   Patient presents with    Leg Swelling    Dizziness    Tinnitus    Headache       History Obtained From:     Patient/EMR    History of Present Illness:     Suzi Barnes is a 49 y.o. Non- / non  female with a history of hypothyroidism and Suboxone use who presents with Leg Swelling, Dizziness, Tinnitus, and Headache   and is admitted to the hospital for the management of Hypoxia.     According to patient, she is concerned due to headache, tinnitus, bilateral lower extremity edema dizziness and rash in her left lower extremity.  She states that she was here 2 days ago for a scheduled MRI after repeated falls.  She endorses hitting her head during her last fall.     Upon presentation to the ER the patient was lethargic but alert and oriented.  Bilateral lymphedema noted.  Skin fold redness noted particularly on the right lower extremity.  Suspect fungal origin.  Chart review reveals that the patient has been seen by vascular for lymphedema in the past and has been advised to wear compression stockings.     her initial troponin was 15 which down trended to 8.  Her EKG was not concerning.  Her CT head was negative for acute findings, however, unfortunately her chest x-ray was notable for possible pneumonia.  She became hypoxic with an SpO2 of 89%.  Oxygen applied.     Plan to admit for pneumonia, hypoxia.  IV antibiotics. Patient endorses being current

## 2025-05-02 NOTE — CONSULTS
NEUROLOGY INPATIENT CONSULT NOTE    5/2/2025         Suzi Barnes is a  49 y.o. female admitted on 5/1/2025 with  Hypoxemia [R09.02]  Dizziness [R42]  Hypoxia [R09.02]  Cellulitis of left lower extremity [L03.116]  Pneumonia due to infectious organism, unspecified laterality, unspecified part of lung [J18.9]    Reason for consult: Headache  History is obtained mostly from the patient and the medical record and from the caregivers. Chart is reviewed and patient is examined.   Briefly, this is a  49 y.o. female with hx of hypothyroidism was admitted on 5/1/2025 with c/o intermittent headaches located bifrontally with pressure-like pain with moderately severe intensity lasting for several hours.  Her last severe headache was 2 days ago and it was associated with dizziness occurring frontally without any radiation.  She also has occasional throbbing pain as well.  She has dizziness with lightheadedness and occasional room spinning sensation.  She has vomited once with headaches and admits to having photophobia and phonophobia.  These headaches had been occurring on a weekly basis for the past 2 and half months.  Denies history of head injury or stroke.  She has not been on any daily medications for headaches.  She also stated that she has been on Suboxone.  CT head on admit is negative for acute intracranial abnormalities.  Chest x-ray with pneumonia and was started on IV antibiotic therapy.       No current facility-administered medications on file prior to encounter.     Current Outpatient Medications on File Prior to Encounter   Medication Sig Dispense Refill    levothyroxine (SYNTHROID) 200 MCG tablet TAKE 1 TABLET BY MOUTH DAILY 90 tablet 0    famotidine (PEPCID) 20 MG tablet TAKE 1 TABLET BY MOUTH TWICE DAILY 60 tablet 2    ondansetron (ZOFRAN-ODT) 4 MG disintegrating tablet TAKE 1 TABLET BY MOUTH EVERY 12 HOURS AS NEEDED FOR NAUSEA OR VOMITING 60 tablet 2    albuterol sulfate HFA (VENTOLIN HFA) 108 (90 Base)  ventricles and sulci are normal in size and configuration.  The  sellar/suprasellar regions appear unremarkable.  The normal signal voids  within the major intracranial vessels appear maintained.  No abnormal focus  of enhancement is seen within the brain.    ORBITS: The visualized portion of the orbits demonstrate no acute abnormality.    SINUSES: The visualized paranasal sinuses and mastoid air cells demonstrate  no acute abnormality.    BONES/SOFT TISSUES: The bone marrow signal intensity appears normal. The soft  tissues demonstrate no acute abnormality.    Impression  No acute process.  No structural lesion identified to account for the  patient's symptoms.    No results found for this or any previous visit.    Results for orders placed during the hospital encounter of 05/01/25    CT HEAD WO CONTRAST    Narrative  EXAMINATION:  CT OF THE HEAD WITHOUT CONTRAST  5/1/2025 11:01 pm    TECHNIQUE:  CT of the head was performed without the administration of intravenous  contrast. Automated exposure control, iterative reconstruction, and/or weight  based adjustment of the mA/kV was utilized to reduce the radiation dose to as  low as reasonably achievable.    COMPARISON:  CT brain 12/29/2024.  MRI brain 04/23/2025.    HISTORY:  ORDERING SYSTEM PROVIDED HISTORY: dizziness  TECHNOLOGIST PROVIDED HISTORY:  dizziness    Decision Support Exception - unselect if not a suspected or confirmed  emergency medical condition->Emergency Medical Condition (MA)  Is the patient pregnant?->No  Reason for Exam: dizziness    FINDINGS:  BRAIN/VENTRICLES: There is no acute intracranial hemorrhage, mass effect or  midline shift.  No abnormal extra-axial fluid collection.  The gray-white  differentiation is maintained without evidence of an acute infarct.  There is  no evidence of hydrocephalus.    ORBITS: The visualized portion of the orbits demonstrate no acute abnormality.    SINUSES: The visualized paranasal sinuses and mastoid air cells

## 2025-05-02 NOTE — PROGRESS NOTES
[facility]   Pharmacy Pharmacokinetic Monitoring Service - Vancomycin     Suzi Barnes is a 49 y.o. female starting on vancomycin therapy for SSTI. Pharmacy consulted by Courtney Hahn APRN - NP for monitoring and adjustment.    Target Concentration: Goal trough of 10-15 mg/L and AUC/CHRISTIANO <500 mg*hr/L    Additional Antimicrobials: Zithromax and Rocephin.     Pertinent Laboratory Values:   Wt Readings from Last 1 Encounters:   05/02/25 118.9 kg (262 lb 2 oz)     Temp Readings from Last 1 Encounters:   05/02/25 98.1 °F (36.7 °C) (Axillary)     Estimated Creatinine Clearance: 104 mL/min (based on SCr of 0.8 mg/dL).  Recent Labs     05/01/25  2158   CREATININE 0.8   BUN 11   WBC 9.0     Procalcitonin: N/A.    MRSA Nasal Swab: N/A. Non-respiratory infection.    Plan:  Dosing recommendations based on Bayesian software  Start vancomycin loading dose 2000 mg once followed by 1250 mg q12h.  Anticipated AUC of 524 mg/L.hr and trough concentration of 12.4 mg/L at steady state  Renal labs as indicated   Vancomycin concentration ordered for 05/03 @ 1200   Pharmacy will continue to monitor patient and adjust therapy as indicated    Thank you for the consult,  Tay Bolton RPH  5/2/2025 4:48 AM

## 2025-05-02 NOTE — ED NOTES
Situation  Name: Suzi Barnes  Admitting: Dx Hypoxia [R09.02]  Isolation Precautions No active isolations  Code Status: Full Code  Alerts: N/A  Where is the patient from? Home  HPI: complaining of bilateral lower extremity edema as well as a rash on the left lower extremity. Patient is concerned about cellulitis. Patient also feels dizziness and a headache. The patient had a fall 2 weeks ago and was assessed with an MRI but is unsure what the results were.     Background  PMH:   Past Medical History:   Diagnosis Date    Hypothyroid      Allergies: No Known Allergies  Diet: No diet orders on file  Activity:   Ambulation status: Walker  Precautions: fall  Flu & Covid:  Medications Administered         azithromycin (ZITHROMAX) 500 mg in sodium chloride 0.9 % 250 mL IVPB (Zhid2Edd) Admin Date  05/02/2025 Action  New Bag Dose  500 mg Rate  250 mL/hr Route  IntraVENous Documented By  Mary Jane aRy RN        cefTRIAXone (ROCEPHIN) 1,000 mg in sterile water 10 mL IV syringe Admin Date  05/02/2025 Action  Given Dose  1,000 mg Rate   Route  IntraVENous Documented By  Mary Jane Ray RN            LDA codes  Peripherally Inserted Central Catheter:    Central Venous Catheter:    Peripheral Intravenous Line:   Peripheral IV 05/01/25 Right Antecubital (Active)   Site Assessment Clean, dry & intact 05/01/25 2210   Line Status Blood return noted;Flushed;Specimen collected 05/01/25 2210   Phlebitis Assessment No symptoms 05/01/25 2210   Infiltration Assessment 0 05/01/25 2210   Dressing Status Clean, dry & intact 05/01/25 2210   Dressing Type Transparent 05/01/25 2210   Dressing Intervention New 05/01/25 2210     Drain(s):    Airway:    Intraosseous Line:    Atrial Line:      Assessment  Pertinent assessment:   VS: Blood pressure 111/71, pulse 71, temperature 98.1 °F (36.7 °C), temperature source Oral, resp. rate 10, height 1.6 m (5' 3\"), weight 104.3 kg (230 lb), SpO2 98%.  GCS: [place] Coma Scale  Eye Opening:

## 2025-05-02 NOTE — ED NOTES
Pt presents to the ED with chief complaints of dizziness, bilateral leg swelling, headache, and tinnitus. Pt states she was here 2x days ago for a scheduled MRI following a couple falls. Pt states that most recent fall she had (pt unable to state date of last fall) she hit her head. Pt states the leg swelling has increased so much recently that it has become hard to ambulate. Pt is very withdrawn appearing very lethargic in triage, pt denies any drug/ alcohol use.

## 2025-05-02 NOTE — FLOWSHEET NOTE
05/02/25 1713   Treatment Team Notification   Reason for Communication Evaluate   Name of Team Member Notified Dr. Rebolledo   Treatment Team Role Attending Provider   Method of Communication Secure Message   Response Waiting for response   Notification Time 1713     ph 7.571  pCO2 24.0  pO2 183.1  bicarb 21.5  O2 Sat venous 94.7

## 2025-05-03 VITALS
HEIGHT: 63 IN | TEMPERATURE: 97.6 F | SYSTOLIC BLOOD PRESSURE: 150 MMHG | OXYGEN SATURATION: 95 % | WEIGHT: 262.13 LBS | DIASTOLIC BLOOD PRESSURE: 80 MMHG | BODY MASS INDEX: 46.45 KG/M2 | HEART RATE: 78 BPM | RESPIRATION RATE: 20 BRPM

## 2025-05-03 LAB
ANION GAP SERPL CALCULATED.3IONS-SCNC: 9 MMOL/L (ref 9–16)
BASOPHILS # BLD: 0 K/UL (ref 0–0.2)
BASOPHILS NFR BLD: 1 % (ref 0–2)
BUN SERPL-MCNC: 8 MG/DL (ref 6–20)
CALCIUM SERPL-MCNC: 7.9 MG/DL (ref 8.6–10.4)
CHLORIDE SERPL-SCNC: 107 MMOL/L (ref 98–107)
CO2 SERPL-SCNC: 25 MMOL/L (ref 20–31)
CREAT SERPL-MCNC: 0.5 MG/DL (ref 0.7–1.2)
EOSINOPHIL # BLD: 0.2 K/UL (ref 0–0.4)
EOSINOPHILS RELATIVE PERCENT: 3 % (ref 0–4)
ERYTHROCYTE [DISTWIDTH] IN BLOOD BY AUTOMATED COUNT: 16.9 % (ref 11.5–14.9)
GFR, ESTIMATED: >90 ML/MIN/1.73M2
GLUCOSE SERPL-MCNC: 90 MG/DL (ref 74–99)
HCT VFR BLD AUTO: 35 % (ref 36–46)
HGB BLD-MCNC: 11.1 G/DL (ref 12–16)
LYMPHOCYTES NFR BLD: 2.2 K/UL (ref 1–4.8)
LYMPHOCYTES RELATIVE PERCENT: 35 % (ref 24–44)
MCH RBC QN AUTO: 27.4 PG (ref 26–34)
MCHC RBC AUTO-ENTMCNC: 31.6 G/DL (ref 31–37)
MCV RBC AUTO: 86.7 FL (ref 80–100)
MONOCYTES NFR BLD: 0.4 K/UL (ref 0.1–1.3)
MONOCYTES NFR BLD: 6 % (ref 1–7)
MRSA, DNA, NASAL: NEGATIVE
NEUTROPHILS NFR BLD: 55 % (ref 36–66)
NEUTS SEG NFR BLD: 3.5 K/UL (ref 1.3–9.1)
PLATELET # BLD AUTO: 328 K/UL (ref 150–450)
PMV BLD AUTO: 7.6 FL (ref 6–12)
POTASSIUM SERPL-SCNC: 3.8 MMOL/L (ref 3.7–5.3)
RBC # BLD AUTO: 4.04 M/UL (ref 4–5.2)
SODIUM SERPL-SCNC: 141 MMOL/L (ref 136–145)
SPECIMEN DESCRIPTION: NORMAL
WBC OTHER # BLD: 6.3 K/UL (ref 3.5–11)

## 2025-05-03 PROCEDURE — 6370000000 HC RX 637 (ALT 250 FOR IP)

## 2025-05-03 PROCEDURE — 85025 COMPLETE CBC W/AUTO DIFF WBC: CPT

## 2025-05-03 PROCEDURE — 2580000003 HC RX 258

## 2025-05-03 PROCEDURE — 80048 BASIC METABOLIC PNL TOTAL CA: CPT

## 2025-05-03 PROCEDURE — 6360000002 HC RX W HCPCS

## 2025-05-03 PROCEDURE — 36415 COLL VENOUS BLD VENIPUNCTURE: CPT

## 2025-05-03 PROCEDURE — 2500000003 HC RX 250 WO HCPCS

## 2025-05-03 PROCEDURE — 99239 HOSP IP/OBS DSCHRG MGMT >30: CPT | Performed by: INTERNAL MEDICINE

## 2025-05-03 PROCEDURE — 6370000000 HC RX 637 (ALT 250 FOR IP): Performed by: NURSE PRACTITIONER

## 2025-05-03 RX ORDER — CEPHALEXIN 500 MG/1
500 CAPSULE ORAL 2 TIMES DAILY
Qty: 10 CAPSULE | Refills: 0 | Status: SHIPPED | OUTPATIENT
Start: 2025-05-03 | End: 2025-05-08

## 2025-05-03 RX ADMIN — WATER 1000 MG: 1 INJECTION INTRAMUSCULAR; INTRAVENOUS; SUBCUTANEOUS at 06:32

## 2025-05-03 RX ADMIN — MICONAZOLE NITRATE: 20 POWDER TOPICAL at 11:59

## 2025-05-03 RX ADMIN — Medication 10 ML: at 08:47

## 2025-05-03 RX ADMIN — LEVOTHYROXINE SODIUM 200 MCG: 0.1 TABLET ORAL at 06:32

## 2025-05-03 RX ADMIN — BUPRENORPHINE AND NALOXONE 1 FILM: 8; 2 FILM, SOLUBLE BUCCAL; SUBLINGUAL at 08:43

## 2025-05-03 RX ADMIN — FAMOTIDINE 20 MG: 20 TABLET, FILM COATED ORAL at 08:43

## 2025-05-03 RX ADMIN — ESCITALOPRAM OXALATE 10 MG: 10 TABLET ORAL at 08:43

## 2025-05-03 RX ADMIN — AZITHROMYCIN MONOHYDRATE 250 MG: 500 INJECTION, POWDER, LYOPHILIZED, FOR SOLUTION INTRAVENOUS at 05:41

## 2025-05-03 RX ADMIN — ENOXAPARIN SODIUM 30 MG: 100 INJECTION SUBCUTANEOUS at 08:43

## 2025-05-03 ASSESSMENT — PAIN SCALES - GENERAL: PAINLEVEL_OUTOF10: 0

## 2025-05-03 NOTE — DISCHARGE SUMMARY
IN-PATIENT SERVICE   [facility] Internal Medicine    Discharge Summary     Patient ID: Suzi Barnes  :  1975   MRN: [id number]     ACCOUNT:  [id number]   Patient's PCP: Nathalie Mayen DO  Admit Date: 2025   Discharge Date: 5/3/2025    Length of Stay: 1  Code Status:  Full Code  Admitting Physician: Odette Berry MD  Discharge Physician: Albertina Rebolledo MD     Active Discharge Diagnoses:     Primary Problem  Hypoxia      Hospital Problems  Active Hospital Problems    Diagnosis Date Noted    Hypoxia [R09.02] 2025    Acute non intractable tension-type headache [G44.209] 2025    Episodic migraine [G43.909] 2025    Cellulitis of left lower extremity [L03.116] 2025       Admission Condition:  fair     Discharged Condition: fair    Hospital Stay:     Hospital Course:  Suzi Barnes is a 49 y.o. female who was admitted for the management of Hypoxia , presented to ER with Leg Swelling, Dizziness, Tinnitus, and Headache  Patient is 49-year-old female multiple comorbidities including history of anxiety depression, hypothyroidism noncompliant with medications, chronic Suboxone use chronic headaches presented to the ER with multiple complaints,  Patient is complaining of lower extremity edema, though I do could not appreciate significant pedal edema also reportedly said that she had a purpleish discoloration of the toe, pulses palpable, feet warm,  No cyanosis seen  Had severe headaches tinnitus which has been going on for a long time had MRI done recently which was negative, CTA done last year was negative, head CT negative,  Seen by neurology, was started on Toradol for headaches, headache improved,  VBG did not show any hypercapnia  Troponin elevation troponin trending down no chest pain  proBNP negative  Hypokalemia replace potassium  Chest x-ray consistent with pneumonia though no clinical sign of pneumonia, Pro-Pavan negative no leukocytosis  I did edema of the

## 2025-05-03 NOTE — CARE COORDINATION
Case Management   Daily Progress Note       Patient Name: Suzi Barnes                   YOB: 1975  Diagnosis: Hypoxemia [R09.02]  Dizziness [R42]  Hypoxia [R09.02]  Cellulitis of left lower extremity [L03.116]  Pneumonia due to infectious organism, unspecified laterality, unspecified part of lung [J18.9]                       GMLOS: 3.8 days  Length of Stay: 1  days    Readmission Risk (Low < 19, Mod (19-27), High > 27): Readmission Risk Score: 8.8      Patient is alert and oriented.    Spoke with patient, and Current Transitional Plan is:    [x] Home Independently    [] Home with HC    [] Skilled Nursing Facility    [] Acute Rehabilitation    [] Long Term Acute Care (LTAC)    [] Other:     Plan for the Stay (Medical Management) : Neuro Consult. Recent MRI Brain negative. IV Zithro, Rocephin.     Workflow Continuation (Additional Notes) : Hospital follow up with PCP was made and is in discharge paperwork.    Discharge order in.    Electronically signed by Mer Sweeney RN on 5/3/2025 at 10:51 AM

## 2025-05-03 NOTE — PROGRESS NOTES
Discharge teaching and instructions for diagnosis of Hypoxia completed with patient using teachback method. AVS reviewed. Escripted Rxs to home pharmacy. Patient voiced understanding regarding prescriptions, follow up appointments, and care of self at home. Denies questions. IV d/c'd with cath intact and drsg applied. Skin Pk/W/D. Easy respirations. Denies pain. D/c'd with all belongings. Discharged ambulatory and in a wheelchair to  home with support per family.

## 2025-05-03 NOTE — PLAN OF CARE
Problem: Discharge Planning  Goal: Discharge to home or other facility with appropriate resources  5/3/2025 0716 by Trish Greenberg RN  Outcome: Progressing     Problem: Safety - Adult  Goal: Free from fall injury  5/3/2025 0716 by Trish Greenberg RN  Outcome: Progressing     Problem: ABCDS Injury Assessment  Goal: Absence of physical injury  5/3/2025 0716 by Trish Greenberg RN  Outcome: Progressing     Problem: Pain  Goal: Verbalizes/displays adequate comfort level or baseline comfort level  Outcome: Progressing     Problem: Skin/Tissue Integrity  Goal: Skin integrity remains intact  Description: 1.  Monitor for areas of redness and/or skin breakdown2.  Assess vascular access sites hourly3.  Every 4-6 hours minimum:  Change oxygen saturation probe site4.  Every 4-6 hours:  If on nasal continuous positive airway pressure, respiratory therapy assess nares and determine need for appliance change or resting period  Outcome: Progressing

## 2025-05-05 ENCOUNTER — TELEPHONE (OUTPATIENT)
Age: 50
End: 2025-05-05

## 2025-05-05 NOTE — TELEPHONE ENCOUNTER
Care Transitions Initial Follow Up Call    Outreach made within 2 business days of discharge: Yes    Patient: Suzi Barnes Patient : 1975   MRN: [id number]  Reason for Admission: Hypoxia  Discharge Date: 5/3/25       Spoke with: Unable to reach patient - VM full.    Discharge department/facility: [facility]    Follow Up  Future Appointments   Date Time Provider Department Center   2025  1:45 PM Nathalie Mayen DO [facility] E[facility] DEP       Adan Patel MA

## 2025-05-13 RX ORDER — POLYETHYLENE GLYCOL 3350 17 G/17G
POWDER ORAL
Qty: 510 G | Refills: 4 | Status: SHIPPED | OUTPATIENT
Start: 2025-05-13

## 2025-05-13 NOTE — TELEPHONE ENCOUNTER
lower extremity edema     Constipation     Gastroesophageal reflux disease without esophagitis     Acute hip pain, left     Hypoxia     Acute non intractable tension-type headache     Episodic migraine     Cellulitis of left lower extremity

## 2025-06-12 DIAGNOSIS — E03.9 HYPOTHYROIDISM, UNSPECIFIED TYPE: ICD-10-CM

## 2025-06-12 NOTE — TELEPHONE ENCOUNTER
Last visit: 11/26/2024  Last Med refill: 03/26/2025  Does patient have enough medication for 72 hours: No:     Next Visit Date:  No future appointments.    Health Maintenance   Topic Date Due    Hepatitis B vaccine (1 of 3 - 19+ 3-dose series) Never done    DTaP/Tdap/Td vaccine (1 - Tdap) Never done    Breast cancer screen  Never done    Colorectal Cancer Screen  Never done    Cervical cancer screen  03/20/2020    Shingles vaccine (1 of 2) Never done    Pneumococcal 50+ years Vaccine (1 of 1 - PCV) Never done    COVID-19 Vaccine (1 - 2024-25 season) Never done    Annual Wellness Visit (Medicare Advantage)  Never done    A1C test (Diabetic or Prediabetic)  08/19/2025    Depression Screen  05/26/2026    Lipids  02/27/2028    Flu vaccine  Completed    Hepatitis C screen  Completed    HIV screen  Completed    Hepatitis A vaccine  Aged Out    Hib vaccine  Aged Out    Polio vaccine  Aged Out    Meningococcal (ACWY) vaccine  Aged Out    Meningococcal B vaccine  Aged Out    Depression Monitoring  Discontinued       Hemoglobin A1C (%)   Date Value   08/19/2024 6.0   10/02/2023 5.8   11/21/2022 5.7             ( goal A1C is < 7)   No components found for: \"LABMICR\"  No components found for: \"LDLCHOLESTEROL\", \"LDLCALC\"    (goal LDL is <100)   AST (U/L)   Date Value   12/01/2022 20     ALT (U/L)   Date Value   12/01/2022 13     BUN (mg/dL)   Date Value   05/03/2025 8     BP Readings from Last 3 Encounters:   05/03/25 (!) 150/80   12/29/24 120/80   11/26/24 119/77          (goal 120/80)    All Future Testing planned in [facility]  Lab Frequency Next Occurrence   Vascular duplex lower extremity venous right Once 08/19/2024   JOY DIGITAL SCREEN W OR WO CAD BILATERAL Once 08/19/2024   XR HIP LEFT (2-3 VIEWS) Once 10/04/2024               Patient Active Problem List:     Bilateral swelling of feet     Hypothyroidism     Bilateral lower extremity edema     Constipation     Gastroesophageal reflux disease without esophagitis     Acute

## 2025-06-16 DIAGNOSIS — K21.9 GASTROESOPHAGEAL REFLUX DISEASE WITHOUT ESOPHAGITIS: ICD-10-CM

## 2025-06-16 RX ORDER — LEVOTHYROXINE SODIUM 200 UG/1
200 TABLET ORAL DAILY
Qty: 90 TABLET | Refills: 0 | Status: SHIPPED | OUTPATIENT
Start: 2025-06-16

## 2025-06-16 RX ORDER — FAMOTIDINE 20 MG/1
20 TABLET, FILM COATED ORAL 2 TIMES DAILY
Qty: 60 TABLET | Refills: 2 | Status: SHIPPED | OUTPATIENT
Start: 2025-06-16

## 2025-06-16 NOTE — TELEPHONE ENCOUNTER
Last visit: 11.26.24  Last Med refill: 5.27.25  Does patient have enough medication for 72 hours: Yes    Next Visit Date:  No future appointments.    Health Maintenance   Topic Date Due    Hepatitis B vaccine (1 of 3 - 19+ 3-dose series) Never done    DTaP/Tdap/Td vaccine (1 - Tdap) Never done    Breast cancer screen  Never done    Colorectal Cancer Screen  Never done    Cervical cancer screen  03/20/2020    Shingles vaccine (1 of 2) Never done    Pneumococcal 50+ years Vaccine (1 of 1 - PCV) Never done    COVID-19 Vaccine (1 - 2024-25 season) Never done    Annual Wellness Visit (Medicare Advantage)  Never done    A1C test (Diabetic or Prediabetic)  08/19/2025    Depression Screen  05/26/2026    Lipids  02/27/2028    Flu vaccine  Completed    Hepatitis C screen  Completed    HIV screen  Completed    Hepatitis A vaccine  Aged Out    Hib vaccine  Aged Out    Polio vaccine  Aged Out    Meningococcal (ACWY) vaccine  Aged Out    Meningococcal B vaccine  Aged Out    Depression Monitoring  Discontinued       Hemoglobin A1C (%)   Date Value   08/19/2024 6.0   10/02/2023 5.8   11/21/2022 5.7             ( goal A1C is < 7)   No components found for: \"LABMICR\"  No components found for: \"LDLCHOLESTEROL\", \"LDLCALC\"    (goal LDL is <100)   AST (U/L)   Date Value   12/01/2022 20     ALT (U/L)   Date Value   12/01/2022 13     BUN (mg/dL)   Date Value   05/03/2025 8     BP Readings from Last 3 Encounters:   05/03/25 (!) 150/80   12/29/24 120/80   11/26/24 119/77          (goal 120/80)    All Future Testing planned in [facility]  Lab Frequency Next Occurrence   Vascular duplex lower extremity venous right Once 08/19/2024   JOY DIGITAL SCREEN W OR WO CAD BILATERAL Once 08/19/2024   XR HIP LEFT (2-3 VIEWS) Once 10/04/2024               Patient Active Problem List:     Bilateral swelling of feet     Hypothyroidism     Bilateral lower extremity edema     Constipation     Gastroesophageal reflux disease without esophagitis     Acute hip

## 2025-08-05 ENCOUNTER — TELEPHONE (OUTPATIENT)
Dept: NEUROLOGY | Age: 50
End: 2025-08-05

## 2025-09-02 DIAGNOSIS — K21.9 GASTROESOPHAGEAL REFLUX DISEASE WITHOUT ESOPHAGITIS: ICD-10-CM

## 2025-09-02 RX ORDER — FAMOTIDINE 20 MG/1
20 TABLET, FILM COATED ORAL 2 TIMES DAILY
Qty: 60 TABLET | Refills: 2 | Status: SHIPPED | OUTPATIENT
Start: 2025-09-02